# Patient Record
Sex: FEMALE | Race: WHITE
[De-identification: names, ages, dates, MRNs, and addresses within clinical notes are randomized per-mention and may not be internally consistent; named-entity substitution may affect disease eponyms.]

---

## 2017-01-09 ENCOUNTER — PRIOR ORIGINAL RECORDS (OUTPATIENT)
Dept: OTHER | Age: 82
End: 2017-01-09

## 2017-01-10 LAB
BUN: 24 MG/DL
CALCIUM: 9.6 MG/DL
CHLORIDE: 103 MEQ/L
CREATININE, SERUM: 1 MG/DL
GLUCOSE: 148 MG/DL
POTASSIUM, SERUM: 4.5 MEQ/L
SODIUM: 146 MEQ/L

## 2017-01-20 ENCOUNTER — PRIOR ORIGINAL RECORDS (OUTPATIENT)
Dept: OTHER | Age: 82
End: 2017-01-20

## 2017-02-06 ENCOUNTER — PRIOR ORIGINAL RECORDS (OUTPATIENT)
Dept: OTHER | Age: 82
End: 2017-02-06

## 2017-02-07 ENCOUNTER — PRIOR ORIGINAL RECORDS (OUTPATIENT)
Dept: OTHER | Age: 82
End: 2017-02-07

## 2017-02-07 LAB
BUN: 26 MG/DL
CALCIUM: 9.2 MG/DL
CHLORIDE: 103 MEQ/L
CREATININE, SERUM: 1.2 MG/DL
GLUCOSE: 137 MG/DL
INR: 2.7
POTASSIUM, SERUM: 4.3 MEQ/L
SODIUM: 146 MEQ/L

## 2017-02-08 ENCOUNTER — PRIOR ORIGINAL RECORDS (OUTPATIENT)
Dept: OTHER | Age: 82
End: 2017-02-08

## 2017-02-24 ENCOUNTER — PRIOR ORIGINAL RECORDS (OUTPATIENT)
Dept: OTHER | Age: 82
End: 2017-02-24

## 2017-02-27 ENCOUNTER — PRIOR ORIGINAL RECORDS (OUTPATIENT)
Dept: OTHER | Age: 82
End: 2017-02-27

## 2017-02-28 ENCOUNTER — PRIOR ORIGINAL RECORDS (OUTPATIENT)
Dept: OTHER | Age: 82
End: 2017-02-28

## 2017-02-28 ENCOUNTER — HOSPITAL ENCOUNTER (OUTPATIENT)
Dept: LAB | Facility: HOSPITAL | Age: 82
Discharge: HOME | End: 2017-02-28
Attending: INTERNAL MEDICINE
Payer: MEDICARE

## 2017-02-28 LAB
ALBUMIN SERPL-MCNC: 3.9 G/DL (ref 3.5–4.8)
ALP LIVER SERPL-CCNC: 432 U/L (ref 55–142)
ALT SERPL-CCNC: 32 U/L (ref 14–54)
AST SERPL-CCNC: 26 U/L (ref 15–41)
BASOPHILS # BLD AUTO: 0.06 X10(3) UL (ref 0–0.1)
BASOPHILS NFR BLD AUTO: 0.7 %
BETA NATRIURETIC PEPTIDE: 404 PG/ML (ref 2–99)
BILIRUB SERPL-MCNC: 1.8 MG/DL (ref 0.1–2)
BUN BLD-MCNC: 17 MG/DL (ref 8–20)
CALCIUM BLD-MCNC: 9.7 MG/DL (ref 8.3–10.3)
CHLORIDE: 107 MMOL/L (ref 101–111)
CO2: 30 MMOL/L (ref 22–32)
CREAT BLD-MCNC: 1.19 MG/DL (ref 0.55–1.02)
EOSINOPHIL # BLD AUTO: 0.09 X10(3) UL (ref 0–0.3)
EOSINOPHIL NFR BLD AUTO: 1 %
ERYTHROCYTE [DISTWIDTH] IN BLOOD BY AUTOMATED COUNT: 14.6 % (ref 11.5–16)
GLUCOSE BLD-MCNC: 125 MG/DL (ref 70–99)
HCT VFR BLD AUTO: 39.3 % (ref 34–50)
HGB BLD-MCNC: 12.5 G/DL (ref 12–16)
IMMATURE GRANULOCYTE COUNT: 0.03 X10(3) UL (ref 0–1)
IMMATURE GRANULOCYTE RATIO %: 0.3 %
LYMPHOCYTES # BLD AUTO: 1 X10(3) UL (ref 0.9–4)
LYMPHOCYTES NFR BLD AUTO: 10.9 %
M PROTEIN MFR SERPL ELPH: 7.5 G/DL (ref 6.1–8.3)
MCH RBC QN AUTO: 28.3 PG (ref 27–33.2)
MCHC RBC AUTO-ENTMCNC: 31.8 G/DL (ref 31–37)
MCV RBC AUTO: 89.1 FL (ref 81–100)
MONOCYTES # BLD AUTO: 0.75 X10(3) UL (ref 0.1–0.6)
MONOCYTES NFR BLD AUTO: 8.2 %
NEUTROPHIL ABS PRELIM: 7.25 X10 (3) UL (ref 1.3–6.7)
NEUTROPHILS # BLD AUTO: 7.25 X10(3) UL (ref 1.3–6.7)
NEUTROPHILS NFR BLD AUTO: 78.9 %
PLATELET # BLD AUTO: 202 10(3)UL (ref 150–450)
POTASSIUM SERPL-SCNC: 3.7 MMOL/L (ref 3.6–5.1)
RBC # BLD AUTO: 4.41 X10(6)UL (ref 3.8–5.1)
RED CELL DISTRIBUTION WIDTH-SD: 47.6 FL (ref 35.1–46.3)
SODIUM SERPL-SCNC: 144 MMOL/L (ref 136–144)
WBC # BLD AUTO: 9.2 X10(3) UL (ref 4–13)

## 2017-02-28 PROCEDURE — 36415 COLL VENOUS BLD VENIPUNCTURE: CPT | Performed by: INTERNAL MEDICINE

## 2017-02-28 PROCEDURE — 85025 COMPLETE CBC W/AUTO DIFF WBC: CPT | Performed by: INTERNAL MEDICINE

## 2017-02-28 PROCEDURE — 83880 ASSAY OF NATRIURETIC PEPTIDE: CPT | Performed by: INTERNAL MEDICINE

## 2017-02-28 PROCEDURE — 80053 COMPREHEN METABOLIC PANEL: CPT | Performed by: INTERNAL MEDICINE

## 2017-03-07 ENCOUNTER — PRIOR ORIGINAL RECORDS (OUTPATIENT)
Dept: OTHER | Age: 82
End: 2017-03-07

## 2017-03-14 ENCOUNTER — PRIOR ORIGINAL RECORDS (OUTPATIENT)
Dept: OTHER | Age: 82
End: 2017-03-14

## 2017-03-16 LAB
ALBUMIN: 3.9 G/DL
ALKALINE PHOSPHATATE(ALK PHOS): 432 IU/L
BILIRUBIN TOTAL: 1.8 MG/DL
BNP: 404 PMOL/L
BUN: 17 MG/DL
CALCIUM: 9.7 MG/DL
CHLORIDE: 107 MEQ/L
CREATININE, SERUM: 1.19 MG/DL
GLUCOSE: 125 MG/DL
HEMATOCRIT: 39.3 %
HEMOGLOBIN: 12.5 G/DL
PLATELETS: 202 K/UL
POTASSIUM, SERUM: 3.7 MEQ/L
PROTEIN, TOTAL: 7.5 G/DL
RED BLOOD COUNT: 4.41 X 10-6/U
SGOT (AST): 26 IU/L
SGPT (ALT): 32 IU/L
SODIUM: 144 MEQ/L
WHITE BLOOD COUNT: 9.2 X 10-3/U

## 2017-04-07 ENCOUNTER — MYAURORA ACCOUNT LINK (OUTPATIENT)
Dept: OTHER | Age: 82
End: 2017-04-07

## 2017-04-07 ENCOUNTER — PRIOR ORIGINAL RECORDS (OUTPATIENT)
Dept: OTHER | Age: 82
End: 2017-04-07

## 2017-04-17 ENCOUNTER — HOSPITAL ENCOUNTER (OUTPATIENT)
Dept: CV DIAGNOSTICS | Facility: HOSPITAL | Age: 82
Discharge: HOME | End: 2017-04-17
Attending: INTERNAL MEDICINE
Payer: MEDICARE

## 2017-04-17 DIAGNOSIS — I25.10 CAD (CORONARY ARTERY DISEASE): ICD-10-CM

## 2017-04-17 DIAGNOSIS — I20.8 ANGINA AT REST (HCC): ICD-10-CM

## 2017-04-17 PROCEDURE — 78452 HT MUSCLE IMAGE SPECT MULT: CPT | Performed by: INTERNAL MEDICINE

## 2017-04-17 PROCEDURE — 93018 CV STRESS TEST I&R ONLY: CPT | Performed by: INTERNAL MEDICINE

## 2017-04-17 PROCEDURE — 78452 HT MUSCLE IMAGE SPECT MULT: CPT

## 2017-04-17 PROCEDURE — 93017 CV STRESS TEST TRACING ONLY: CPT

## 2017-04-21 ENCOUNTER — PRIOR ORIGINAL RECORDS (OUTPATIENT)
Dept: OTHER | Age: 82
End: 2017-04-21

## 2017-05-11 ENCOUNTER — RT VISIT (OUTPATIENT)
Dept: RESPIRATORY THERAPY | Facility: HOSPITAL | Age: 82
End: 2017-05-11
Attending: INTERNAL MEDICINE
Payer: MEDICARE

## 2017-05-11 DIAGNOSIS — R06.00 DYSPNEA: ICD-10-CM

## 2017-05-11 DIAGNOSIS — I25.10 CORONARY ARTERY DISEASE: ICD-10-CM

## 2017-05-11 DIAGNOSIS — I34.9 NONRHEUMATIC MITRAL VALVE DISORDER: ICD-10-CM

## 2017-05-11 PROCEDURE — 94726 PLETHYSMOGRAPHY LUNG VOLUMES: CPT

## 2017-05-11 PROCEDURE — 94729 DIFFUSING CAPACITY: CPT

## 2017-05-11 PROCEDURE — 94010 BREATHING CAPACITY TEST: CPT

## 2017-05-12 NOTE — PROCEDURES
Spirometry and flow volume loop appear normal with no evidence of airway obstruction   Normal TLC, no evidence of restriction  The diffusing capacity is moderately decreased,  but corrects into the normal range when alveolar lung volumes are taken into acc

## 2017-05-22 ENCOUNTER — PRIOR ORIGINAL RECORDS (OUTPATIENT)
Dept: OTHER | Age: 82
End: 2017-05-22

## 2017-08-02 ENCOUNTER — MYAURORA ACCOUNT LINK (OUTPATIENT)
Dept: OTHER | Age: 82
End: 2017-08-02

## 2017-08-10 ENCOUNTER — PRIOR ORIGINAL RECORDS (OUTPATIENT)
Dept: OTHER | Age: 82
End: 2017-08-10

## 2017-08-14 ENCOUNTER — PRIOR ORIGINAL RECORDS (OUTPATIENT)
Dept: OTHER | Age: 82
End: 2017-08-14

## 2017-08-14 LAB
ALBUMIN: 4 G/DL
ALKALINE PHOSPHATATE(ALK PHOS): 362 IU/L
BILIRUBIN TOTAL: 1.6 MG/DL
BUN: 26 MG/DL
CALCIUM: 9.4 MG/DL
CHLORIDE: 105 MEQ/L
CREATININE, SERUM: 1.2 MG/DL
GLUCOSE: 144 MG/DL
POTASSIUM, SERUM: 5.1 MEQ/L
PROTEIN, TOTAL: 6.5 G/DL
SGOT (AST): 14 IU/L
SGPT (ALT): 10 IU/L
SODIUM: 145 MEQ/L

## 2017-08-17 ENCOUNTER — PRIOR ORIGINAL RECORDS (OUTPATIENT)
Dept: OTHER | Age: 82
End: 2017-08-17

## 2017-08-28 ENCOUNTER — PRIOR ORIGINAL RECORDS (OUTPATIENT)
Dept: OTHER | Age: 82
End: 2017-08-28

## 2017-11-01 ENCOUNTER — PRIOR ORIGINAL RECORDS (OUTPATIENT)
Dept: OTHER | Age: 82
End: 2017-11-01

## 2017-11-01 ENCOUNTER — MYAURORA ACCOUNT LINK (OUTPATIENT)
Dept: OTHER | Age: 82
End: 2017-11-01

## 2017-11-03 ENCOUNTER — PRIOR ORIGINAL RECORDS (OUTPATIENT)
Dept: OTHER | Age: 82
End: 2017-11-03

## 2017-11-29 ENCOUNTER — PRIOR ORIGINAL RECORDS (OUTPATIENT)
Dept: OTHER | Age: 82
End: 2017-11-29

## 2017-12-06 ENCOUNTER — HOSPITAL ENCOUNTER (OUTPATIENT)
Dept: CV DIAGNOSTICS | Facility: HOSPITAL | Age: 82
Discharge: HOME | End: 2017-12-06
Attending: INTERNAL MEDICINE

## 2017-12-06 ENCOUNTER — MYAURORA ACCOUNT LINK (OUTPATIENT)
Dept: OTHER | Age: 82
End: 2017-12-06

## 2017-12-06 DIAGNOSIS — I25.10 CORONARY ARTERIOSCLEROSIS: ICD-10-CM

## 2017-12-06 DIAGNOSIS — I34.8 OTHER NONRHEUMATIC MITRAL VALVE DISORDERS (CODE): ICD-10-CM

## 2017-12-12 ENCOUNTER — PRIOR ORIGINAL RECORDS (OUTPATIENT)
Dept: OTHER | Age: 82
End: 2017-12-12

## 2017-12-13 ENCOUNTER — PRIOR ORIGINAL RECORDS (OUTPATIENT)
Dept: OTHER | Age: 82
End: 2017-12-13

## 2018-01-08 LAB
ALBUMIN: 4 G/DL
ALKALINE PHOSPHATATE(ALK PHOS): 504 IU/L
BILIRUBIN TOTAL: 1.7 MG/DL
BUN: 17 MG/DL
CALCIUM: 9.6 MG/DL
CHLORIDE: 105 MEQ/L
CHOLESTEROL, TOTAL: 159 MG/DL
CREATININE, SERUM: 1.1 MG/DL
GLUCOSE: 137 MG/DL
HDL CHOLESTEROL: 57 MG/DL
HEMATOCRIT: 40.2 %
HEMOGLOBIN A1C: 7.1 %
HEMOGLOBIN: 12.9 G/DL
LDL CHOLESTEROL: 82 MG/DL
PLATELETS: 163 K/UL
POTASSIUM, SERUM: 4.4 MEQ/L
PROBNP: 2278 PG/ML
PROTEIN, TOTAL: 6.6 G/DL
RED BLOOD COUNT: 4.3 X 10-6/U
SGOT (AST): 18 IU/L
SGPT (ALT): 15 IU/L
SODIUM: 146 MEQ/L
THYROID STIMULATING HORMONE: 3.84 MLU/L
TRIGLYCERIDES: 101 MG/DL
WHITE BLOOD COUNT: 6.2 X 10-3/U

## 2018-06-20 ENCOUNTER — PRIOR ORIGINAL RECORDS (OUTPATIENT)
Dept: OTHER | Age: 83
End: 2018-06-20

## 2018-06-27 ENCOUNTER — HOSPITAL ENCOUNTER (OUTPATIENT)
Dept: CARDIOLOGY CLINIC | Facility: HOSPITAL | Age: 83
Discharge: HOME | End: 2018-06-27
Attending: NURSE PRACTITIONER
Payer: MEDICARE

## 2018-06-27 ENCOUNTER — MYAURORA ACCOUNT LINK (OUTPATIENT)
Dept: OTHER | Age: 83
End: 2018-06-27

## 2018-06-27 ENCOUNTER — PRIOR ORIGINAL RECORDS (OUTPATIENT)
Dept: OTHER | Age: 83
End: 2018-06-27

## 2018-06-27 VITALS
OXYGEN SATURATION: 95 % | DIASTOLIC BLOOD PRESSURE: 55 MMHG | SYSTOLIC BLOOD PRESSURE: 140 MMHG | HEART RATE: 78 BPM | RESPIRATION RATE: 17 BRPM

## 2018-06-27 DIAGNOSIS — I50.31 ACUTE DIASTOLIC CONGESTIVE HEART FAILURE (HCC): Primary | ICD-10-CM

## 2018-06-27 PROCEDURE — 80048 BASIC METABOLIC PNL TOTAL CA: CPT | Performed by: NURSE PRACTITIONER

## 2018-06-27 PROCEDURE — 36415 COLL VENOUS BLD VENIPUNCTURE: CPT

## 2018-06-27 PROCEDURE — 85027 COMPLETE CBC AUTOMATED: CPT | Performed by: NURSE PRACTITIONER

## 2018-06-27 PROCEDURE — A4216 STERILE WATER/SALINE, 10 ML: HCPCS | Performed by: NURSE PRACTITIONER

## 2018-06-27 PROCEDURE — 83880 ASSAY OF NATRIURETIC PEPTIDE: CPT | Performed by: NURSE PRACTITIONER

## 2018-06-27 PROCEDURE — 99211 OFF/OP EST MAY X REQ PHY/QHP: CPT

## 2018-06-27 PROCEDURE — 96375 TX/PRO/DX INJ NEW DRUG ADDON: CPT

## 2018-06-27 PROCEDURE — 96374 THER/PROPH/DIAG INJ IV PUSH: CPT

## 2018-06-27 RX ORDER — BUMETANIDE 0.25 MG/ML
3 INJECTION, SOLUTION INTRAMUSCULAR; INTRAVENOUS ONCE
Status: COMPLETED | OUTPATIENT
Start: 2018-06-27 | End: 2018-06-27

## 2018-06-27 RX ADMIN — BUMETANIDE 3 MG: 0.25 INJECTION, SOLUTION INTRAMUSCULAR; INTRAVENOUS at 15:03:00

## 2018-06-27 NOTE — PROGRESS NOTES
Pt here for RN visit for IV diuretics. 22G IV inserted into left forearm and labs drawn from site. IV Diuril 250mg and IV Bumex 3mg given. Pt tolerated well. Pt to follow in [facility], appointment made. IV removed, catheter tip intact and dressing applied.

## 2018-06-27 NOTE — PROGRESS NOTES
RN visit only for IV diuretics, Diuril 250mg IVP x 1 and Bumex 3mg IVP x 1 sent from Dr. Andreina Burton office by Florinda SPEARS. She would like us to see her regularly in the [facility] [address] with a new patient appt in 2 weeks.  Reyes Prince was unable

## 2018-07-03 ENCOUNTER — TELEPHONE (OUTPATIENT)
Dept: CARDIOLOGY CLINIC | Facility: HOSPITAL | Age: 83
End: 2018-07-03

## 2018-07-03 NOTE — PROGRESS NOTES
Attempted to reach pt, no answer, left voice message for her to call back. Purpose of phone call was to check weights. Pt was [facility] to [facility] by REGAN Wynn for a nurse visit/IV diuretics.

## 2018-07-03 NOTE — PROGRESS NOTES
Received return phone call from pt - states she is feeling better, breathing is much better than when she came in to see Caty Angela in the office. When she came in for her office visit, she was up by 2 pounds.  Today she states she is back to her normal weight

## 2018-07-05 ENCOUNTER — TELEPHONE (OUTPATIENT)
Dept: CARDIOLOGY CLINIC | Facility: HOSPITAL | Age: 83
End: 2018-07-05

## 2018-07-05 NOTE — PROGRESS NOTES
Labs reviewed. No changes made.     Na 145  K 5.0  Chl 103  CO2 26  Anion gap 16  Calcium 9.3  BUN 32  Cr 1.2    REGAN Luevano  7/5/2018  7:00 PM  [address]

## 2018-07-11 ENCOUNTER — HOSPITAL ENCOUNTER (OUTPATIENT)
Dept: CARDIOLOGY CLINIC | Facility: HOSPITAL | Age: 83
Discharge: HOME | End: 2018-07-11
Attending: NURSE PRACTITIONER
Payer: MEDICARE

## 2018-07-11 VITALS
DIASTOLIC BLOOD PRESSURE: 53 MMHG | OXYGEN SATURATION: 99 % | SYSTOLIC BLOOD PRESSURE: 139 MMHG | WEIGHT: 140.81 LBS | HEART RATE: 83 BPM | RESPIRATION RATE: 20 BRPM

## 2018-07-11 DIAGNOSIS — I10 ESSENTIAL HYPERTENSION: ICD-10-CM

## 2018-07-11 DIAGNOSIS — I50.33 ACUTE ON CHRONIC HEART FAILURE WITH NORMAL EJECTION FRACTION (HCC): Primary | ICD-10-CM

## 2018-07-11 DIAGNOSIS — Z95.1 HX OF CABG: ICD-10-CM

## 2018-07-11 DIAGNOSIS — I48.20 CHRONIC ATRIAL FIBRILLATION (HCC): ICD-10-CM

## 2018-07-11 DIAGNOSIS — N18.30 CKD (CHRONIC KIDNEY DISEASE) STAGE 3, GFR 30-59 ML/MIN (HCC): ICD-10-CM

## 2018-07-11 LAB
BUN BLD-MCNC: 24 MG/DL (ref 8–20)
CALCIUM BLD-MCNC: 9.3 MG/DL (ref 8.3–10.3)
CHLORIDE: 107 MMOL/L (ref 101–111)
CO2: 26 MMOL/L (ref 22–32)
CREAT BLD-MCNC: 1.26 MG/DL (ref 0.55–1.02)
GLUCOSE BLD-MCNC: 114 MG/DL (ref 70–99)
POTASSIUM SERPL-SCNC: 4.2 MMOL/L (ref 3.6–5.1)
SODIUM SERPL-SCNC: 141 MMOL/L (ref 136–144)

## 2018-07-11 PROCEDURE — 99204 OFFICE O/P NEW MOD 45 MIN: CPT | Performed by: NURSE PRACTITIONER

## 2018-07-11 RX ORDER — CHOLECALCIFEROL (VITAMIN D3) 1250 MCG
CAPSULE ORAL WEEKLY
COMMUNITY
End: 2020-03-09

## 2018-07-11 RX ORDER — TORSEMIDE 20 MG/1
20 TABLET ORAL 2 TIMES DAILY
COMMUNITY
End: 2019-03-04

## 2018-07-11 RX ORDER — AMLODIPINE BESYLATE 5 MG/1
5 TABLET ORAL 2 TIMES DAILY
COMMUNITY
End: 2019-04-15

## 2018-07-11 RX ORDER — BUMETANIDE 0.25 MG/ML
3 INJECTION, SOLUTION INTRAMUSCULAR; INTRAVENOUS ONCE
Status: COMPLETED | OUTPATIENT
Start: 2018-07-11 | End: 2018-07-11

## 2018-07-11 RX ORDER — METOPROLOL SUCCINATE 200 MG/1
200 TABLET, EXTENDED RELEASE ORAL NIGHTLY
COMMUNITY

## 2018-07-11 RX ORDER — WARFARIN SODIUM 2.5 MG/1
2.5 TABLET ORAL
Status: ON HOLD | COMMUNITY
End: 2020-05-18

## 2018-07-11 RX ORDER — WARFARIN SODIUM 5 MG/1
5 TABLET ORAL
COMMUNITY
End: 2020-03-13

## 2018-07-11 RX ORDER — LOSARTAN POTASSIUM 100 MG/1
100 TABLET ORAL NIGHTLY
COMMUNITY

## 2018-07-11 RX ADMIN — BUMETANIDE 3 MG: 0.25 INJECTION, SOLUTION INTRAMUSCULAR; INTRAVENOUS at 13:11:00

## 2018-07-11 NOTE — PROGRESS NOTES
Pt. Assessed. Pt. c/o shortness of breath that gets better as the day progresses. Weight 140 lbs. Pt states her weight is stable and at baseline, she has some LE edema. APN notified of pt's complaints. Labs ordered.  Reviewed allergies and list of current

## 2018-07-11 NOTE — PROGRESS NOTES
[facility] Cardiac Health Progress Note    Nito Cronin is a 80year old female who presents to clinic at the request of Dr. Juana Dang for APN assessment and management of chronic HFpEF and is functional class 3.   Patient has a history bases  CV:                 Irregularly irregular  Abdomen:      Non-distended, soft, non-tender, BS+  Extremities:    Trace-+1 LE edema; 2+ pedal pulses  Skin:               Pink, warm, dry   Neurological:  A&O x 3; GARCIA      Current Outpatient Prescription (S): 18mm Hg. Valve area (VTI): 1.33cm^2. Indexed valve     area (VTI): 0.81cm^2/m^2.  4. Mitral valve: A prosthesis was present and functioning normally. The     prosthesis had a normal range of motion.  The sewing ring appeared normal,     had no rocking this patient providing counseling, coordination of care and education related specifically to heart failure.     REGAN Peralta  7/11/2018

## 2018-07-13 ENCOUNTER — MYAURORA ACCOUNT LINK (OUTPATIENT)
Dept: OTHER | Age: 83
End: 2018-07-13

## 2018-07-13 ENCOUNTER — HOSPITAL ENCOUNTER (OUTPATIENT)
Dept: CV DIAGNOSTICS | Facility: HOSPITAL | Age: 83
Discharge: HOME | End: 2018-07-13
Attending: INTERNAL MEDICINE

## 2018-07-13 DIAGNOSIS — R06.09 OTHER FORMS OF DYSPNEA: ICD-10-CM

## 2018-07-13 DIAGNOSIS — I48.91 ATRIAL FIBRILLATION, UNSPECIFIED TYPE (HCC): ICD-10-CM

## 2018-07-16 ENCOUNTER — PRIOR ORIGINAL RECORDS (OUTPATIENT)
Dept: OTHER | Age: 83
End: 2018-07-16

## 2018-07-30 ENCOUNTER — HOSPITAL ENCOUNTER (OUTPATIENT)
Dept: CARDIOLOGY CLINIC | Facility: HOSPITAL | Age: 83
Discharge: HOME | End: 2018-07-30
Attending: NURSE PRACTITIONER
Payer: MEDICARE

## 2018-07-30 VITALS
HEART RATE: 91 BPM | RESPIRATION RATE: 22 BRPM | OXYGEN SATURATION: 93 % | WEIGHT: 140.38 LBS | SYSTOLIC BLOOD PRESSURE: 132 MMHG | DIASTOLIC BLOOD PRESSURE: 56 MMHG

## 2018-07-30 DIAGNOSIS — I10 ESSENTIAL HYPERTENSION: ICD-10-CM

## 2018-07-30 DIAGNOSIS — I50.32 CHRONIC HEART FAILURE WITH NORMAL EJECTION FRACTION (HCC): Primary | ICD-10-CM

## 2018-07-30 DIAGNOSIS — I48.20 CHRONIC ATRIAL FIBRILLATION (HCC): ICD-10-CM

## 2018-07-30 DIAGNOSIS — N18.30 CKD (CHRONIC KIDNEY DISEASE) STAGE 3, GFR 30-59 ML/MIN (HCC): ICD-10-CM

## 2018-07-30 LAB
ANION GAP SERPL CALC-SCNC: 9 MMOL/L (ref 0–18)
BUN BLD-MCNC: 25 MG/DL (ref 8–20)
BUN/CREAT SERPL: 21.2 (ref 10–20)
CALCIUM BLD-MCNC: 9.4 MG/DL (ref 8.3–10.3)
CHLORIDE SERPL-SCNC: 107 MMOL/L (ref 101–111)
CO2 SERPL-SCNC: 26 MMOL/L (ref 22–32)
CREAT BLD-MCNC: 1.18 MG/DL (ref 0.55–1.02)
GLUCOSE BLD-MCNC: 121 MG/DL (ref 70–99)
OSMOLALITY SERPL CALC.SUM OF ELEC: 300 MOSM/KG (ref 275–295)
POTASSIUM SERPL-SCNC: 4 MMOL/L (ref 3.6–5.1)
SODIUM SERPL-SCNC: 142 MMOL/L (ref 136–144)

## 2018-07-30 PROCEDURE — 99214 OFFICE O/P EST MOD 30 MIN: CPT | Performed by: NURSE PRACTITIONER

## 2018-07-30 NOTE — PROGRESS NOTES
[facility] Cardiac [facility] Progress Note    Zhanna Grigsby is a 80year old female who presents to clinic for APN assessment and management of chronic preserved EF/RV heart failure and is functional class 3.      Subjective:  She returns fo  07/30/2018   CO2 26.0 07/30/2018    07/30/2018   CA 9.4 07/30/2018         Current Outpatient Prescriptions:   •  torsemide 20 MG Oral Tab, Take 20 mg by mouth 2 (two) times daily.   , Disp: , Rfl:   •  losartan 100 MG Oral Tab, Take by mout · Prescribed home PT for unsteady gait.   · Return to clinic in 3-4 weeks  · CHF discharge instructions given    I spent greater than >30 minutes with this patient providing counseling, coordination of care and education related specifically to heart fail

## 2018-07-30 NOTE — PROGRESS NOTES
Pt. Assessed. No s/s of shortness of breath, fatigue, chest pain or edema noted. Weight stable at 140  lbs. Reviewed current list of patient's allergies and medication; updated EMR. Labs drawn to assess kidney function.  Reviewed follow-up appointment and H

## 2018-08-01 ENCOUNTER — PRIOR ORIGINAL RECORDS (OUTPATIENT)
Dept: OTHER | Age: 83
End: 2018-08-01

## 2018-08-21 ENCOUNTER — HOSPITAL ENCOUNTER (OUTPATIENT)
Dept: CARDIOLOGY CLINIC | Facility: HOSPITAL | Age: 83
Discharge: HOME | End: 2018-08-21
Attending: NURSE PRACTITIONER
Payer: MEDICARE

## 2018-08-21 VITALS
HEART RATE: 68 BPM | OXYGEN SATURATION: 96 % | WEIGHT: 141 LBS | SYSTOLIC BLOOD PRESSURE: 138 MMHG | DIASTOLIC BLOOD PRESSURE: 119 MMHG | RESPIRATION RATE: 17 BRPM

## 2018-08-21 DIAGNOSIS — I48.20 CHRONIC ATRIAL FIBRILLATION (HCC): ICD-10-CM

## 2018-08-21 DIAGNOSIS — N18.30 CKD (CHRONIC KIDNEY DISEASE) STAGE 3, GFR 30-59 ML/MIN (HCC): ICD-10-CM

## 2018-08-21 DIAGNOSIS — I50.32 CHRONIC HEART FAILURE WITH NORMAL EJECTION FRACTION (HCC): Primary | ICD-10-CM

## 2018-08-21 DIAGNOSIS — I10 ESSENTIAL HYPERTENSION: ICD-10-CM

## 2018-08-21 LAB
ANION GAP SERPL CALC-SCNC: 8 MMOL/L (ref 0–18)
BUN BLD-MCNC: 32 MG/DL (ref 8–20)
BUN/CREAT SERPL: 26 (ref 10–20)
CALCIUM BLD-MCNC: 9.6 MG/DL (ref 8.3–10.3)
CHLORIDE SERPL-SCNC: 107 MMOL/L (ref 101–111)
CO2 SERPL-SCNC: 29 MMOL/L (ref 22–32)
CREAT BLD-MCNC: 1.23 MG/DL (ref 0.55–1.02)
GLUCOSE BLD-MCNC: 128 MG/DL (ref 70–99)
OSMOLALITY SERPL CALC.SUM OF ELEC: 307 MOSM/KG (ref 275–295)
POTASSIUM SERPL-SCNC: 4.2 MMOL/L (ref 3.6–5.1)
SODIUM SERPL-SCNC: 144 MMOL/L (ref 136–144)

## 2018-08-21 PROCEDURE — 99214 OFFICE O/P EST MOD 30 MIN: CPT | Performed by: NURSE PRACTITIONER

## 2018-08-21 NOTE — PROGRESS NOTES
[facility] Cardiac Health Progress Note    Mortimer Latch is a 80year old female who presents to clinic for APN assessment and management of chronic HFpEF/RV heart failure and is functional class 3.      Subjective:  She returns for routi Oral Tablet 24 Hr, Take 200 mg by mouth nightly., Disp: , Rfl:   •  Warfarin Sodium 5 MG Oral Tab, Take 5 mg by mouth 4 (four) times a week. Tue, Thu, Sat, Sun, Disp: , Rfl:   •  Warfarin Sodium 2.5 MG Oral Tab, Take 2.5 mg by mouth 3 (three) times a week.

## 2018-08-21 NOTE — PROGRESS NOTES
Pt. assessed. No s/s of shortness of breath, fatigue, chest pain or edema noted. Weight stable at 141.0 lbs. Reviewed current list of patient's allergies and medication; updated EMR. Labs ordered and drawn in the [facility] to assess kidney function.  Reviewed fol

## 2018-08-22 ENCOUNTER — MYAURORA ACCOUNT LINK (OUTPATIENT)
Dept: OTHER | Age: 83
End: 2018-08-22

## 2018-09-07 ENCOUNTER — PRIOR ORIGINAL RECORDS (OUTPATIENT)
Dept: OTHER | Age: 83
End: 2018-09-07

## 2018-09-13 ENCOUNTER — PRIOR ORIGINAL RECORDS (OUTPATIENT)
Dept: OTHER | Age: 83
End: 2018-09-13

## 2018-09-13 LAB — INR: 0

## 2018-09-21 ENCOUNTER — PRIOR ORIGINAL RECORDS (OUTPATIENT)
Dept: OTHER | Age: 83
End: 2018-09-21

## 2018-09-21 LAB — INR: 1.9

## 2018-10-04 ENCOUNTER — HOSPITAL ENCOUNTER (OUTPATIENT)
Dept: LAB | Facility: HOSPITAL | Age: 83
Discharge: HOME | End: 2018-10-04
Attending: NURSE PRACTITIONER
Payer: MEDICARE

## 2018-10-04 ENCOUNTER — HOSPITAL ENCOUNTER (OUTPATIENT)
Dept: CARDIOLOGY CLINIC | Facility: HOSPITAL | Age: 83
Discharge: HOME | End: 2018-10-04
Attending: NURSE PRACTITIONER
Payer: MEDICARE

## 2018-10-04 VITALS
DIASTOLIC BLOOD PRESSURE: 46 MMHG | OXYGEN SATURATION: 92 % | HEART RATE: 67 BPM | SYSTOLIC BLOOD PRESSURE: 119 MMHG | RESPIRATION RATE: 20 BRPM | WEIGHT: 141.19 LBS

## 2018-10-04 DIAGNOSIS — I10 ESSENTIAL HYPERTENSION: ICD-10-CM

## 2018-10-04 DIAGNOSIS — I50.33 ACUTE ON CHRONIC HEART FAILURE WITH NORMAL EJECTION FRACTION (HCC): ICD-10-CM

## 2018-10-04 DIAGNOSIS — I50.9 CHF (CONGESTIVE HEART FAILURE) (HCC): ICD-10-CM

## 2018-10-04 DIAGNOSIS — I50.813 ACUTE ON CHRONIC RIGHT-SIDED HEART FAILURE (HCC): Primary | ICD-10-CM

## 2018-10-04 DIAGNOSIS — I48.20 CHRONIC ATRIAL FIBRILLATION (HCC): ICD-10-CM

## 2018-10-04 DIAGNOSIS — N18.30 CKD (CHRONIC KIDNEY DISEASE) STAGE 3, GFR 30-59 ML/MIN (HCC): ICD-10-CM

## 2018-10-04 PROCEDURE — 80048 BASIC METABOLIC PNL TOTAL CA: CPT | Performed by: NURSE PRACTITIONER

## 2018-10-04 PROCEDURE — 36415 COLL VENOUS BLD VENIPUNCTURE: CPT | Performed by: NURSE PRACTITIONER

## 2018-10-04 PROCEDURE — 85027 COMPLETE CBC AUTOMATED: CPT | Performed by: NURSE PRACTITIONER

## 2018-10-04 PROCEDURE — 99215 OFFICE O/P EST HI 40 MIN: CPT | Performed by: NURSE PRACTITIONER

## 2018-10-04 RX ORDER — SPIRONOLACTONE 25 MG/1
12.5 TABLET ORAL DAILY
Qty: 30 TABLET | Refills: 6 | Status: SHIPPED | OUTPATIENT
Start: 2018-10-04 | End: 2019-10-18

## 2018-10-04 RX ORDER — BUMETANIDE 0.25 MG/ML
3 INJECTION, SOLUTION INTRAMUSCULAR; INTRAVENOUS ONCE
Status: COMPLETED | OUTPATIENT
Start: 2018-10-04 | End: 2018-10-04

## 2018-10-04 RX ADMIN — BUMETANIDE 3 MG: 0.25 INJECTION, SOLUTION INTRAMUSCULAR; INTRAVENOUS at 14:47:00

## 2018-10-04 NOTE — PROGRESS NOTES
[facility] Cardiac Health Progress Note    Irlanda Arredondo is a 80year old female who presents to clinic for APN assessment and management of chronic  HFpEF/RV heart failure and is functional class 3.      Subjective:  She returns for rout 32.2   RDW  14.5   WBC  7.5   PLT  176.0           Current Outpatient Medications:   •  torsemide 20 MG Oral Tab, Take 20 mg by mouth 2 (two) times daily.   , Disp: , Rfl:   •  losartan 100 MG Oral Tab, Take by mouth nightly., Disp: , Rfl:   •  Metoprolol S 1  · Return to clinic in 1 week. · F/U with Dr. Jailyn Obrien as planned on 12/20/18.   · CHF discharge instructions given    I spent greater than >40 minutes with this patient providing counseling, coordination of care and education related specifically to hea

## 2018-10-04 NOTE — PROGRESS NOTES
Pt. assessed. Pt. c/o LE edema. Weight stable at 141.2lbs. APN notified of pt's c/o LE edema. Labs ordered and reviewed. Reviewed allergies and list of current medications with pt. and updated it in the EMR. IV established in the [facility] per protocol.  IV bume

## 2018-10-10 ENCOUNTER — HOSPITAL ENCOUNTER (OUTPATIENT)
Dept: CARDIOLOGY CLINIC | Facility: HOSPITAL | Age: 83
Discharge: HOME | End: 2018-10-10
Attending: NURSE PRACTITIONER
Payer: MEDICARE

## 2018-10-10 VITALS
DIASTOLIC BLOOD PRESSURE: 48 MMHG | WEIGHT: 139.13 LBS | RESPIRATION RATE: 24 BRPM | OXYGEN SATURATION: 92 % | SYSTOLIC BLOOD PRESSURE: 129 MMHG | HEART RATE: 68 BPM

## 2018-10-10 DIAGNOSIS — I10 ESSENTIAL HYPERTENSION: ICD-10-CM

## 2018-10-10 DIAGNOSIS — Z98.890 HX OF MITRAL VALVE REPAIR: ICD-10-CM

## 2018-10-10 DIAGNOSIS — I48.20 CHRONIC ATRIAL FIBRILLATION (HCC): ICD-10-CM

## 2018-10-10 DIAGNOSIS — Z87.19 H/O: GI BLEED: ICD-10-CM

## 2018-10-10 DIAGNOSIS — Z95.1 HX OF CABG: ICD-10-CM

## 2018-10-10 DIAGNOSIS — N18.30 CKD (CHRONIC KIDNEY DISEASE) STAGE 3, GFR 30-59 ML/MIN (HCC): ICD-10-CM

## 2018-10-10 DIAGNOSIS — I50.812 CHRONIC RIGHT-SIDED HEART FAILURE (HCC): Primary | ICD-10-CM

## 2018-10-10 PROCEDURE — 99212 OFFICE O/P EST SF 10 MIN: CPT

## 2018-10-10 PROCEDURE — 36415 COLL VENOUS BLD VENIPUNCTURE: CPT

## 2018-10-10 PROCEDURE — 80048 BASIC METABOLIC PNL TOTAL CA: CPT | Performed by: NURSE PRACTITIONER

## 2018-10-10 NOTE — PROGRESS NOTES
Pt assessed. Denies shortness of breath, abdominal bloating, or edema. Weight down 2 lbs at 139.1 lbs. Reports that she is urinating more since the Aldactone was added. Reviewed current list of patient's allergies and medication; updated EMR. Labs ordered.

## 2018-10-10 NOTE — PROGRESS NOTES
[facility]  Heart Failure Clinic Progress Note    Sandra Pretty is a 80year old female who presents to clinic for APN assessment and management of chronic  HFpEF/RV heart failure and is functional class 3.     Subjective:  Oxana Taylor returns for routine file prior to encounter.      Exam:   General:         Alert, in no apparent distress  HEENT:          JVD mildly elevated upright  Lungs:            Clear upper with mild right basilar crackles             CV:                  S1, S2 irreg, irreg, 1/6 AMY

## 2018-10-24 ENCOUNTER — PRIOR ORIGINAL RECORDS (OUTPATIENT)
Dept: OTHER | Age: 83
End: 2018-10-24

## 2018-10-24 LAB — INR: 3.3

## 2018-10-30 ENCOUNTER — PRIOR ORIGINAL RECORDS (OUTPATIENT)
Dept: OTHER | Age: 83
End: 2018-10-30

## 2018-10-30 LAB — INR: 0

## 2018-11-07 ENCOUNTER — MYAURORA ACCOUNT LINK (OUTPATIENT)
Dept: OTHER | Age: 83
End: 2018-11-07

## 2018-11-07 ENCOUNTER — PRIOR ORIGINAL RECORDS (OUTPATIENT)
Dept: OTHER | Age: 83
End: 2018-11-07

## 2018-11-08 ENCOUNTER — HOSPITAL ENCOUNTER (OUTPATIENT)
Dept: LAB | Facility: HOSPITAL | Age: 83
Discharge: HOME | End: 2018-11-08
Attending: NURSE PRACTITIONER
Payer: MEDICARE

## 2018-11-08 ENCOUNTER — HOSPITAL ENCOUNTER (OUTPATIENT)
Dept: CARDIOLOGY CLINIC | Facility: HOSPITAL | Age: 83
Discharge: HOME | End: 2018-11-08
Attending: NURSE PRACTITIONER
Payer: MEDICARE

## 2018-11-08 VITALS
RESPIRATION RATE: 27 BRPM | WEIGHT: 136.38 LBS | DIASTOLIC BLOOD PRESSURE: 53 MMHG | HEART RATE: 66 BPM | SYSTOLIC BLOOD PRESSURE: 128 MMHG | OXYGEN SATURATION: 96 %

## 2018-11-08 DIAGNOSIS — I48.20 CHRONIC ATRIAL FIBRILLATION (HCC): ICD-10-CM

## 2018-11-08 DIAGNOSIS — I27.20 PULMONARY HYPERTENSION (HCC): ICD-10-CM

## 2018-11-08 DIAGNOSIS — E55.9 VITAMIN D DEFICIENCY: ICD-10-CM

## 2018-11-08 DIAGNOSIS — N18.30 CKD (CHRONIC KIDNEY DISEASE) STAGE 3, GFR 30-59 ML/MIN (HCC): ICD-10-CM

## 2018-11-08 DIAGNOSIS — Z95.1 HX OF CABG: ICD-10-CM

## 2018-11-08 DIAGNOSIS — I50.9 CHF (CONGESTIVE HEART FAILURE) (HCC): ICD-10-CM

## 2018-11-08 DIAGNOSIS — I50.812 CHRONIC RIGHT-SIDED HEART FAILURE (HCC): Primary | ICD-10-CM

## 2018-11-08 PROCEDURE — 99214 OFFICE O/P EST MOD 30 MIN: CPT | Performed by: NURSE PRACTITIONER

## 2018-11-08 PROCEDURE — 36415 COLL VENOUS BLD VENIPUNCTURE: CPT | Performed by: NURSE PRACTITIONER

## 2018-11-08 PROCEDURE — 80048 BASIC METABOLIC PNL TOTAL CA: CPT | Performed by: NURSE PRACTITIONER

## 2018-11-08 NOTE — PROGRESS NOTES
Patient assessed. No s/s of shortness of breath, fatigue, chest pain or edema noted. Weight stable at 136 lbs. Reviewed current list of patient's allergies and medication; updated EMR. BMP drawn in lab to assess kidney function.  Reviewed follow-up appointm

## 2018-11-16 ENCOUNTER — PRIOR ORIGINAL RECORDS (OUTPATIENT)
Dept: OTHER | Age: 83
End: 2018-11-16

## 2018-11-16 LAB — INR: 3.9

## 2018-12-13 ENCOUNTER — PRIOR ORIGINAL RECORDS (OUTPATIENT)
Dept: OTHER | Age: 83
End: 2018-12-13

## 2018-12-14 LAB
INR: 2.4
PROTHROMBIN TIME: 28.9 SECONDS

## 2018-12-20 ENCOUNTER — MYAURORA ACCOUNT LINK (OUTPATIENT)
Dept: OTHER | Age: 83
End: 2018-12-20

## 2018-12-20 ENCOUNTER — PRIOR ORIGINAL RECORDS (OUTPATIENT)
Dept: OTHER | Age: 83
End: 2018-12-20

## 2018-12-24 LAB
ALBUMIN: 4.1 G/DL
ALKALINE PHOSPHATATE(ALK PHOS): 507 IU/L
BILIRUBIN TOTAL: 1.4 MG/DL
BUN: 40 MG/DL
CALCIUM: 10 MG/DL
CHLORIDE: 103 MEQ/L
CHOLESTEROL, TOTAL: 140 MG/DL
CREATININE, SERUM: 1.3 MG/DL
GLUCOSE: 106 MG/DL
HDL CHOLESTEROL: 52 MG/DL
HEMATOCRIT: 38.2 %
HEMOGLOBIN: 12.5 G/DL
LDL CHOLESTEROL: 69 MG/DL
PLATELETS: 200 K/UL
POTASSIUM, SERUM: 5.1 MEQ/L
PROBNP: 2149 PG/ML
PROTEIN, TOTAL: 6.6 G/DL
RED BLOOD COUNT: 4.1 X 10-6/U
SGOT (AST): 17 IU/L
SGPT (ALT): 8 IU/L
SODIUM: 139 MEQ/L
THYROID STIMULATING HORMONE: 4.51 MLU/L
TRIGLYCERIDES: 93 MG/DL
VITAMIN D 25-OH: 36 NG/ML
WHITE BLOOD COUNT: 7.8 X 10-3/U

## 2019-01-03 ENCOUNTER — PRIOR ORIGINAL RECORDS (OUTPATIENT)
Dept: OTHER | Age: 84
End: 2019-01-03

## 2019-01-22 LAB — INR: 0

## 2019-01-23 ENCOUNTER — HOSPITAL ENCOUNTER (OUTPATIENT)
Dept: LAB | Facility: HOSPITAL | Age: 84
Discharge: HOME | End: 2019-01-23
Attending: NURSE PRACTITIONER
Payer: MEDICARE

## 2019-01-23 ENCOUNTER — PRIOR ORIGINAL RECORDS (OUTPATIENT)
Dept: OTHER | Age: 84
End: 2019-01-23

## 2019-01-23 ENCOUNTER — HOSPITAL ENCOUNTER (OUTPATIENT)
Dept: CARDIOLOGY CLINIC | Facility: HOSPITAL | Age: 84
Discharge: HOME | End: 2019-01-23
Attending: NURSE PRACTITIONER
Payer: MEDICARE

## 2019-01-23 VITALS
RESPIRATION RATE: 22 BRPM | HEART RATE: 82 BPM | WEIGHT: 138 LBS | SYSTOLIC BLOOD PRESSURE: 145 MMHG | OXYGEN SATURATION: 99 % | DIASTOLIC BLOOD PRESSURE: 51 MMHG

## 2019-01-23 DIAGNOSIS — Z95.1 HX OF CABG: ICD-10-CM

## 2019-01-23 DIAGNOSIS — Z98.890 HX OF MITRAL VALVE REPAIR: ICD-10-CM

## 2019-01-23 DIAGNOSIS — I10 ESSENTIAL HYPERTENSION: ICD-10-CM

## 2019-01-23 DIAGNOSIS — N18.30 CKD (CHRONIC KIDNEY DISEASE) STAGE 3, GFR 30-59 ML/MIN (HCC): ICD-10-CM

## 2019-01-23 DIAGNOSIS — I27.20 PULMONARY HYPERTENSION (HCC): ICD-10-CM

## 2019-01-23 DIAGNOSIS — I50.9 CHF (CONGESTIVE HEART FAILURE) (HCC): ICD-10-CM

## 2019-01-23 DIAGNOSIS — I48.20 CHRONIC ATRIAL FIBRILLATION (HCC): ICD-10-CM

## 2019-01-23 DIAGNOSIS — I50.33 ACUTE ON CHRONIC HEART FAILURE WITH NORMAL EJECTION FRACTION (HCC): Primary | ICD-10-CM

## 2019-01-23 LAB
ANION GAP SERPL CALC-SCNC: 5 MMOL/L (ref 0–18)
BUN BLD-MCNC: 22 MG/DL (ref 8–20)
BUN/CREAT SERPL: 17.2 (ref 10–20)
CALCIUM BLD-MCNC: 9.4 MG/DL (ref 8.3–10.3)
CHLORIDE SERPL-SCNC: 109 MMOL/L (ref 101–111)
CO2 SERPL-SCNC: 28 MMOL/L (ref 22–32)
CREAT BLD-MCNC: 1.28 MG/DL (ref 0.55–1.02)
DEPRECATED HBV CORE AB SER IA-ACNC: 26.3 NG/ML (ref 18–340)
ERYTHROCYTE [DISTWIDTH] IN BLOOD BY AUTOMATED COUNT: 13.9 % (ref 11.5–16)
GLUCOSE BLD-MCNC: 109 MG/DL (ref 70–99)
HCT VFR BLD AUTO: 34.5 % (ref 34–50)
HGB BLD-MCNC: 11.1 G/DL (ref 12–16)
INR BLD: 2.71 (ref 0.9–1.1)
INR: 2.71
IRON SATURATION: 6 % (ref 15–50)
IRON: 27 UG/DL (ref 28–170)
MCH RBC QN AUTO: 30.5 PG (ref 27–33.2)
MCHC RBC AUTO-ENTMCNC: 32.2 G/DL (ref 31–37)
MCV RBC AUTO: 94.8 FL (ref 81–100)
OSMOLALITY SERPL CALC.SUM OF ELEC: 298 MOSM/KG (ref 275–295)
PLATELET # BLD AUTO: 197 10(3)UL (ref 150–450)
POTASSIUM SERPL-SCNC: 4.2 MMOL/L (ref 3.6–5.1)
PRO-BETA NATRIURETIC PEPTIDE: 2931 PG/ML (ref ?–450)
PSA SERPL DL<=0.01 NG/ML-MCNC: 29.6 SECONDS (ref 12.4–14.7)
RBC # BLD AUTO: 3.64 X10(6)UL (ref 3.8–5.1)
RED CELL DISTRIBUTION WIDTH-SD: 48.1 FL (ref 35.1–46.3)
SODIUM SERPL-SCNC: 142 MMOL/L (ref 136–144)
T4 FREE SERPL-MCNC: 1.1 NG/DL (ref 0.9–1.8)
TOTAL IRON BINDING CAPACITY: 444 UG/DL (ref 240–450)
TRANSFERRIN SERPL-MCNC: 298 MG/DL (ref 200–360)
TSI SER-ACNC: 3.21 MIU/ML (ref 0.35–5.5)
WBC # BLD AUTO: 7.8 X10(3) UL (ref 4–13)

## 2019-01-23 PROCEDURE — 85610 PROTHROMBIN TIME: CPT | Performed by: INTERNAL MEDICINE

## 2019-01-23 PROCEDURE — 80048 BASIC METABOLIC PNL TOTAL CA: CPT | Performed by: NURSE PRACTITIONER

## 2019-01-23 PROCEDURE — 85027 COMPLETE CBC AUTOMATED: CPT | Performed by: NURSE PRACTITIONER

## 2019-01-23 PROCEDURE — 83880 ASSAY OF NATRIURETIC PEPTIDE: CPT | Performed by: NURSE PRACTITIONER

## 2019-01-23 PROCEDURE — 36415 COLL VENOUS BLD VENIPUNCTURE: CPT | Performed by: NURSE PRACTITIONER

## 2019-01-23 PROCEDURE — 99215 OFFICE O/P EST HI 40 MIN: CPT | Performed by: NURSE PRACTITIONER

## 2019-01-23 RX ORDER — BUMETANIDE 0.25 MG/ML
2 INJECTION, SOLUTION INTRAMUSCULAR; INTRAVENOUS ONCE
Status: COMPLETED | OUTPATIENT
Start: 2019-01-23 | End: 2019-01-23

## 2019-01-23 RX ADMIN — BUMETANIDE 2 MG: 0.25 INJECTION, SOLUTION INTRAMUSCULAR; INTRAVENOUS at 15:26:00

## 2019-01-23 NOTE — PROGRESS NOTES
Patient assessed. No C/O of Bilateral leg edema. Weight at 138 lbs. C/O  SOB, especially with activity. Reviewed current list of patient's allergies and medication; updated EMR. IV established per protocol. 2 mg of Bumex was given per order.   IV venofer

## 2019-01-23 NOTE — PROGRESS NOTES
[facility] Cardiac Health Progress Note    Sanam Bernal is a 80year old female who presents to clinic for APN assessment and management of chronic RV heart failure with preserved ejection fraction and is functional class 3.      Subject Objective:    Cardiac Rhythm: Atrial fibrillation    /48   Pulse 79   Resp 22   Wt 138 lb (62.6 kg)   SpO2 99%     Lab Results   Component Value Date    T4F 1.1 01/23/2019    TSH 3.210 01/23/2019     Recent Labs   Lab  01/23/19   1433   GLU  109* Take 5 mg by mouth 2 (two) times daily. , Disp: , Rfl:     Exam:   General:         Alert, in no apparent distress  HEENT:          +6-7cm JVD, noted TR  Lungs:            CTAB                     CV:                  Irregularly irregular with 2/6 AMY  Abd

## 2019-01-29 ENCOUNTER — HOSPITAL ENCOUNTER (OUTPATIENT)
Dept: CARDIOLOGY CLINIC | Facility: HOSPITAL | Age: 84
Discharge: HOME | End: 2019-01-29
Attending: NURSE PRACTITIONER
Payer: MEDICARE

## 2019-01-29 VITALS
OXYGEN SATURATION: 97 % | HEART RATE: 72 BPM | WEIGHT: 138.19 LBS | SYSTOLIC BLOOD PRESSURE: 125 MMHG | DIASTOLIC BLOOD PRESSURE: 79 MMHG

## 2019-01-29 DIAGNOSIS — Z95.1 HX OF CABG: ICD-10-CM

## 2019-01-29 DIAGNOSIS — I10 ESSENTIAL HYPERTENSION: ICD-10-CM

## 2019-01-29 DIAGNOSIS — I27.20 PULMONARY HYPERTENSION (HCC): ICD-10-CM

## 2019-01-29 DIAGNOSIS — Z98.890 HX OF MITRAL VALVE REPAIR: ICD-10-CM

## 2019-01-29 DIAGNOSIS — I48.20 CHRONIC ATRIAL FIBRILLATION (HCC): ICD-10-CM

## 2019-01-29 DIAGNOSIS — I50.32 CHRONIC HEART FAILURE WITH NORMAL EJECTION FRACTION (HCC): Primary | ICD-10-CM

## 2019-01-29 DIAGNOSIS — N18.30 CKD (CHRONIC KIDNEY DISEASE) STAGE 3, GFR 30-59 ML/MIN (HCC): ICD-10-CM

## 2019-01-29 LAB
ANION GAP SERPL CALC-SCNC: 6 MMOL/L (ref 0–18)
BUN BLD-MCNC: 29 MG/DL (ref 8–20)
BUN/CREAT SERPL: 24.4 (ref 10–20)
CALCIUM BLD-MCNC: 9.2 MG/DL (ref 8.3–10.3)
CHLORIDE SERPL-SCNC: 109 MMOL/L (ref 101–111)
CO2 SERPL-SCNC: 25 MMOL/L (ref 22–32)
CREAT BLD-MCNC: 1.19 MG/DL (ref 0.55–1.02)
GLUCOSE BLD-MCNC: 109 MG/DL (ref 70–99)
OSMOLALITY SERPL CALC.SUM OF ELEC: 296 MOSM/KG (ref 275–295)
POTASSIUM SERPL-SCNC: 4 MMOL/L (ref 3.6–5.1)
SODIUM SERPL-SCNC: 140 MMOL/L (ref 136–144)

## 2019-01-29 PROCEDURE — 99215 OFFICE O/P EST HI 40 MIN: CPT | Performed by: NURSE PRACTITIONER

## 2019-01-29 NOTE — PROGRESS NOTES
Pt. Assessed. Pt. c/o some sob, however, it is improved. Weight 138.2 lbs which is unchanged from last visit. APN notified of pt's c/o. Labs ordered. Reviewed allergies and list of current medications with pt. And updated it int he EMR.  IV established per

## 2019-01-29 NOTE — PROGRESS NOTES
[facility] Cardiac Health Progress Note    Tea Gastelum is a 80year old female who presents to clinic for APN assessment and management of chronic HFpEF/RV heart failure and is functional class 2-3.      Subjective:  She returns for rou Rfl: 6  •  torsemide 20 MG Oral Tab, Take 20 mg by mouth 2 (two) times daily.   , Disp: , Rfl:   •  losartan 100 MG Oral Tab, Take by mouth nightly., Disp: , Rfl:   •  Metoprolol Succinate  MG Oral Tablet 24 Hr, Take 200 mg by mouth nightly., Disp: , prothesis, with normal function.   8. Vitamin D deficiency - on supplement. 9. Remote Hx GIB 2007 - denies melena. 10. HLD    Plan:   · Continue current medications. · Venofer 200mg IVP x 1, dose 2/3. · Return to clinic in 1 month.   · RN visit for last

## 2019-02-04 ENCOUNTER — HOSPITAL ENCOUNTER (OUTPATIENT)
Dept: CARDIOLOGY CLINIC | Facility: HOSPITAL | Age: 84
Discharge: HOME | End: 2019-02-04
Attending: NURSE PRACTITIONER
Payer: MEDICARE

## 2019-02-04 VITALS — HEART RATE: 80 BPM | WEIGHT: 136.38 LBS | DIASTOLIC BLOOD PRESSURE: 47 MMHG | SYSTOLIC BLOOD PRESSURE: 131 MMHG

## 2019-02-04 PROCEDURE — 99212 OFFICE O/P EST SF 10 MIN: CPT

## 2019-02-04 NOTE — PROGRESS NOTES
RN visit only for IV Venofer 200mg dose 3/3. No APN visit.     DX: Elida Byrne, RALPH  2/4/2019  2:23 PM  [address]

## 2019-02-04 NOTE — PROGRESS NOTES
Pt here for RN visit only. 22G IV inserted into the Right upper arm without difficulty. IV Venofer 200mg infused over 5 minutes. IV removed, catheter tip intact. Vitals Stable.

## 2019-02-28 VITALS
HEIGHT: 62 IN | HEART RATE: 86 BPM | WEIGHT: 141 LBS | SYSTOLIC BLOOD PRESSURE: 124 MMHG | BODY MASS INDEX: 25.95 KG/M2 | DIASTOLIC BLOOD PRESSURE: 58 MMHG

## 2019-02-28 VITALS
SYSTOLIC BLOOD PRESSURE: 112 MMHG | BODY MASS INDEX: 25.95 KG/M2 | HEART RATE: 76 BPM | HEIGHT: 62 IN | WEIGHT: 141 LBS | DIASTOLIC BLOOD PRESSURE: 54 MMHG

## 2019-02-28 VITALS
SYSTOLIC BLOOD PRESSURE: 142 MMHG | WEIGHT: 135 LBS | HEART RATE: 78 BPM | HEIGHT: 62 IN | BODY MASS INDEX: 24.84 KG/M2 | DIASTOLIC BLOOD PRESSURE: 68 MMHG

## 2019-02-28 VITALS — SYSTOLIC BLOOD PRESSURE: 128 MMHG | WEIGHT: 138 LBS | HEART RATE: 80 BPM | DIASTOLIC BLOOD PRESSURE: 58 MMHG

## 2019-03-01 VITALS
WEIGHT: 144 LBS | BODY MASS INDEX: 26.5 KG/M2 | SYSTOLIC BLOOD PRESSURE: 140 MMHG | HEART RATE: 86 BPM | HEIGHT: 62 IN | DIASTOLIC BLOOD PRESSURE: 70 MMHG

## 2019-03-01 VITALS
WEIGHT: 140 LBS | SYSTOLIC BLOOD PRESSURE: 132 MMHG | DIASTOLIC BLOOD PRESSURE: 60 MMHG | HEIGHT: 62 IN | HEART RATE: 60 BPM | BODY MASS INDEX: 25.76 KG/M2

## 2019-03-01 VITALS
HEART RATE: 61 BPM | DIASTOLIC BLOOD PRESSURE: 60 MMHG | HEIGHT: 62 IN | OXYGEN SATURATION: 95 % | SYSTOLIC BLOOD PRESSURE: 162 MMHG | WEIGHT: 142 LBS | BODY MASS INDEX: 26.13 KG/M2

## 2019-03-04 ENCOUNTER — HOSPITAL ENCOUNTER (OUTPATIENT)
Dept: CARDIOLOGY CLINIC | Facility: HOSPITAL | Age: 84
Discharge: HOME | End: 2019-03-04
Attending: NURSE PRACTITIONER
Payer: MEDICARE

## 2019-03-04 ENCOUNTER — ANTI-COAG (OUTPATIENT)
Dept: CARDIOLOGY | Age: 84
End: 2019-03-04

## 2019-03-04 ENCOUNTER — HOSPITAL ENCOUNTER (OUTPATIENT)
Dept: LAB | Facility: HOSPITAL | Age: 84
Discharge: HOME | End: 2019-03-04
Attending: NURSE PRACTITIONER
Payer: MEDICARE

## 2019-03-04 ENCOUNTER — PRIOR ORIGINAL RECORDS (OUTPATIENT)
Dept: OTHER | Age: 84
End: 2019-03-04

## 2019-03-04 ENCOUNTER — HOSPITAL ENCOUNTER (OUTPATIENT)
Dept: LAB | Facility: HOSPITAL | Age: 84
Discharge: HOME | End: 2019-03-04
Attending: INTERNAL MEDICINE
Payer: MEDICARE

## 2019-03-04 VITALS
HEART RATE: 77 BPM | DIASTOLIC BLOOD PRESSURE: 51 MMHG | SYSTOLIC BLOOD PRESSURE: 136 MMHG | WEIGHT: 134 LBS | OXYGEN SATURATION: 96 % | RESPIRATION RATE: 18 BRPM

## 2019-03-04 DIAGNOSIS — I50.32 CHRONIC HEART FAILURE WITH NORMAL EJECTION FRACTION (HCC): Primary | ICD-10-CM

## 2019-03-04 DIAGNOSIS — I50.9 CHF (CONGESTIVE HEART FAILURE) (HCC): ICD-10-CM

## 2019-03-04 DIAGNOSIS — I48.91 ATRIAL FIBRILLATION, UNSPECIFIED TYPE (CMD): ICD-10-CM

## 2019-03-04 DIAGNOSIS — I27.20 PULMONARY HYPERTENSION (HCC): ICD-10-CM

## 2019-03-04 DIAGNOSIS — E55.9 VITAMIN D DEFICIENCY: ICD-10-CM

## 2019-03-04 DIAGNOSIS — I48.91 ATRIAL FIBRILLATION (HCC): ICD-10-CM

## 2019-03-04 DIAGNOSIS — N18.30 CKD (CHRONIC KIDNEY DISEASE) STAGE 3, GFR 30-59 ML/MIN (HCC): ICD-10-CM

## 2019-03-04 DIAGNOSIS — Z98.890 HX OF MITRAL VALVE REPAIR: ICD-10-CM

## 2019-03-04 DIAGNOSIS — Z95.1 HX OF CABG: ICD-10-CM

## 2019-03-04 DIAGNOSIS — I48.20 CHRONIC ATRIAL FIBRILLATION (HCC): ICD-10-CM

## 2019-03-04 DIAGNOSIS — D50.9 IRON DEFICIENCY ANEMIA, UNSPECIFIED IRON DEFICIENCY ANEMIA TYPE: ICD-10-CM

## 2019-03-04 DIAGNOSIS — I10 ESSENTIAL HYPERTENSION: ICD-10-CM

## 2019-03-04 LAB
ANION GAP SERPL CALC-SCNC: 6 MMOL/L (ref 0–18)
BUN BLD-MCNC: 38 MG/DL (ref 7–18)
BUN/CREAT SERPL: 26.6 (ref 10–20)
CALCIUM BLD-MCNC: 9.7 MG/DL (ref 8.5–10.1)
CHLORIDE SERPL-SCNC: 106 MMOL/L (ref 98–107)
CO2 SERPL-SCNC: 27 MMOL/L (ref 21–32)
CREAT BLD-MCNC: 1.43 MG/DL (ref 0.55–1.02)
GLUCOSE BLD-MCNC: 139 MG/DL (ref 70–99)
INR BLD: 2.08 (ref 0.9–1.1)
INR: NORMAL
OSMOLALITY SERPL CALC.SUM OF ELEC: 299 MOSM/KG (ref 275–295)
POTASSIUM SERPL-SCNC: 4.9 MMOL/L (ref 3.5–5.1)
PSA SERPL DL<=0.01 NG/ML-MCNC: 24.7 SECONDS (ref 12.5–14.7)
SODIUM SERPL-SCNC: 139 MMOL/L (ref 136–145)

## 2019-03-04 PROCEDURE — 80048 BASIC METABOLIC PNL TOTAL CA: CPT | Performed by: NURSE PRACTITIONER

## 2019-03-04 PROCEDURE — 99215 OFFICE O/P EST HI 40 MIN: CPT | Performed by: NURSE PRACTITIONER

## 2019-03-04 PROCEDURE — 85610 PROTHROMBIN TIME: CPT | Performed by: NURSE PRACTITIONER

## 2019-03-04 PROCEDURE — 85610 PROTHROMBIN TIME: CPT

## 2019-03-04 PROCEDURE — 36415 COLL VENOUS BLD VENIPUNCTURE: CPT | Performed by: NURSE PRACTITIONER

## 2019-03-04 RX ORDER — TORSEMIDE 20 MG/1
TABLET ORAL
Refills: 0 | Status: SHIPPED | COMMUNITY
Start: 2019-03-04 | End: 2019-04-08

## 2019-03-04 NOTE — PROGRESS NOTES
[facility] Cardiac Health Progress Note    Wang Leal is a 80year old female who presents to clinic for APN assessment and management of chronic HFpEF/RV heart failure and is functional class 3.      Subjective:  She returns for routgrace Range: 21.0 - 32.0 mmol/L 27.0   BUN Latest Ref Range: 7 - 18 mg/dL 38 (H)   CREATININE Latest Ref Range: 0.55 - 1.02 mg/dL 1.43 (H)   CALCIUM Latest Ref Range: 8.5 - 10.1 mg/dL 9.7   BUN/CREAT Ratio Latest Ref Range: 10.0 - 20.0  26.6 (H)   GFR, Non-Afric exacerbation and when to call APN/clinic. Assessment:   1. Chronic HFpEF/RV failure - last LVEF 55-60% on echo with mild-Mod TR, mildly dehydrated. 2.  pHTN - last PAS 60mmhg on echo, RV function moderately reduced, mild to moderate TR.   3. CKD stage 3

## 2019-03-04 NOTE — PROGRESS NOTES
Pt assessed. C/o fatigue and SOB with minimal activity which is not new for her. Denies abdominal bloating or more edema. Weight down 2 lbs at 134 lbs. Reviewed current list of patient's allergies and medication; updated EMR. Labs ordered.  Reviewed follow-

## 2019-03-06 PROBLEM — I48.91 UNSPECIFIED ATRIAL FIBRILLATION (CMD): Status: ACTIVE | Noted: 2019-03-06

## 2019-03-20 ENCOUNTER — TELEPHONE (OUTPATIENT)
Dept: CARDIOLOGY CLINIC | Facility: HOSPITAL | Age: 84
End: 2019-03-20

## 2019-03-20 NOTE — PROGRESS NOTES
Received call from pt's daughter Mj Magana stating that patient was not doing too well. Pt is very SOB with activity but breathing is ok at rest. She is more tired and keeps \"nodding off\". Pt's weight is stable and has actually gone down.  Vy Estrella does

## 2019-03-21 ENCOUNTER — HOSPITAL ENCOUNTER (OUTPATIENT)
Dept: GENERAL RADIOLOGY | Facility: HOSPITAL | Age: 84
Discharge: HOME | End: 2019-03-21
Attending: NURSE PRACTITIONER
Payer: MEDICARE

## 2019-03-21 ENCOUNTER — HOSPITAL ENCOUNTER (OUTPATIENT)
Dept: CARDIOLOGY CLINIC | Facility: HOSPITAL | Age: 84
Discharge: HOME | End: 2019-03-21
Attending: NURSE PRACTITIONER
Payer: MEDICARE

## 2019-03-21 ENCOUNTER — ANTI-COAG (OUTPATIENT)
Dept: CARDIOLOGY | Age: 84
End: 2019-03-21

## 2019-03-21 VITALS
HEART RATE: 98 BPM | SYSTOLIC BLOOD PRESSURE: 148 MMHG | WEIGHT: 134.88 LBS | RESPIRATION RATE: 30 BRPM | OXYGEN SATURATION: 100 % | DIASTOLIC BLOOD PRESSURE: 65 MMHG | TEMPERATURE: 98 F

## 2019-03-21 DIAGNOSIS — D50.9 IRON DEFICIENCY ANEMIA, UNSPECIFIED IRON DEFICIENCY ANEMIA TYPE: ICD-10-CM

## 2019-03-21 DIAGNOSIS — R50.9 FEVER, UNSPECIFIED FEVER CAUSE: ICD-10-CM

## 2019-03-21 DIAGNOSIS — I48.20 CHRONIC ATRIAL FIBRILLATION (HCC): ICD-10-CM

## 2019-03-21 DIAGNOSIS — I48.91 ATRIAL FIBRILLATION, UNSPECIFIED TYPE (CMD): ICD-10-CM

## 2019-03-21 DIAGNOSIS — R06.02 SHORTNESS OF BREATH ON EXERTION: ICD-10-CM

## 2019-03-21 DIAGNOSIS — I27.20 PULMONARY HYPERTENSION (HCC): ICD-10-CM

## 2019-03-21 DIAGNOSIS — N18.30 CKD (CHRONIC KIDNEY DISEASE) STAGE 3, GFR 30-59 ML/MIN (HCC): ICD-10-CM

## 2019-03-21 DIAGNOSIS — I50.33 ACUTE ON CHRONIC HEART FAILURE WITH NORMAL EJECTION FRACTION (HCC): Primary | ICD-10-CM

## 2019-03-21 LAB
ANION GAP SERPL CALC-SCNC: 7 MMOL/L (ref 0–18)
ANION GAP SERPL CALC-SCNC: 9 MMOL/L (ref 0–18)
BUN BLD-MCNC: 32 MG/DL (ref 7–18)
BUN BLD-MCNC: 36 MG/DL (ref 7–18)
BUN/CREAT SERPL: 25 (ref 10–20)
BUN/CREAT SERPL: 27.9 (ref 10–20)
CALCIUM BLD-MCNC: 8.7 MG/DL (ref 8.5–10.1)
CALCIUM BLD-MCNC: 9.2 MG/DL (ref 8.5–10.1)
CHLORIDE SERPL-SCNC: 105 MMOL/L (ref 98–107)
CHLORIDE SERPL-SCNC: 107 MMOL/L (ref 98–107)
CO2 SERPL-SCNC: 23 MMOL/L (ref 21–32)
CO2 SERPL-SCNC: 23 MMOL/L (ref 21–32)
CREAT BLD-MCNC: 1.28 MG/DL (ref 0.55–1.02)
CREAT BLD-MCNC: 1.29 MG/DL (ref 0.55–1.02)
DEPRECATED HBV CORE AB SER IA-ACNC: 137 NG/ML (ref 18–340)
DEPRECATED RDW RBC AUTO: 46.4 FL (ref 35.1–46.3)
ERYTHROCYTE [DISTWIDTH] IN BLOOD BY AUTOMATED COUNT: 13.6 % (ref 11–15)
GLUCOSE BLD-MCNC: 113 MG/DL (ref 70–99)
GLUCOSE BLD-MCNC: 115 MG/DL (ref 70–99)
HCT VFR BLD AUTO: 31.7 % (ref 35–48)
HGB BLD-MCNC: 10.3 G/DL (ref 12–16)
INR BLD: 2.6 (ref 0.9–1.1)
INR PPP: 2.6
IRON SATURATION: 5 % (ref 15–50)
IRON SERPL-MCNC: 14 UG/DL (ref 50–170)
MCH RBC QN AUTO: 30.2 PG (ref 26–34)
MCHC RBC AUTO-ENTMCNC: 32.5 G/DL (ref 31–37)
MCV RBC AUTO: 93 FL (ref 80–100)
OSMOLALITY SERPL CALC.SUM OF ELEC: 289 MOSM/KG (ref 275–295)
OSMOLALITY SERPL CALC.SUM OF ELEC: 296 MOSM/KG (ref 275–295)
PLATELET # BLD AUTO: 304 10(3)UL (ref 150–450)
POTASSIUM SERPL-SCNC: 4 MMOL/L (ref 3.5–5.1)
PROCALCITONIN SERPL-MCNC: 0.08 NG/ML
PSA SERPL DL<=0.01 NG/ML-MCNC: 29.6 SECONDS (ref 12.5–14.7)
RBC # BLD AUTO: 3.41 X10(6)UL (ref 3.8–5.3)
SODIUM SERPL-SCNC: 135 MMOL/L (ref 136–145)
SODIUM SERPL-SCNC: 139 MMOL/L (ref 136–145)
TOTAL IRON BINDING CAPACITY: 295 UG/DL (ref 240–450)
TOTAL IRON BINDING CAPACITY: 328 UG/DL (ref 240–450)
TRANSFERRIN SERPL-MCNC: 198 MG/DL (ref 200–360)
TRANSFERRIN SERPL-MCNC: 220 MG/DL (ref 200–360)
WBC # BLD AUTO: 9.6 X10(3) UL (ref 4–11)

## 2019-03-21 PROCEDURE — 99215 OFFICE O/P EST HI 40 MIN: CPT | Performed by: NURSE PRACTITIONER

## 2019-03-21 PROCEDURE — 71045 X-RAY EXAM CHEST 1 VIEW: CPT | Performed by: NURSE PRACTITIONER

## 2019-03-21 RX ORDER — BUMETANIDE 0.25 MG/ML
2 INJECTION, SOLUTION INTRAMUSCULAR; INTRAVENOUS ONCE
Status: COMPLETED | OUTPATIENT
Start: 2019-03-21 | End: 2019-03-21

## 2019-03-21 RX ADMIN — BUMETANIDE 2 MG: 0.25 INJECTION, SOLUTION INTRAMUSCULAR; INTRAVENOUS at 18:09:00

## 2019-03-21 NOTE — PROGRESS NOTES
Patient assessed. No C/O of Bilateral leg edema, but very SOB. Weight at 134.9 lbs, Up slightly. Reviewed current list of patient's allergies and medication; updated EMR. BMP, CBC, INR, Iron study done. Due to SOB, Xray done. IV established per protocol.  2

## 2019-03-21 NOTE — PROGRESS NOTES
[facility] Cardiac Health Progress Note    Ollie Gomez is a 80year old female who presents to clinic for APN assessment and management of chronic HFpEF/RV and is functional class 3-4.      Subjective:  She returns for routine assessmen 91-92% on RA    Lab Results   Component Value Date    WBC 9.6 03/21/2019    HGB 10.3 03/21/2019    HCT 31.7 03/21/2019    .0 03/21/2019    CREATSERUM 1.28 03/21/2019    BUN 32 03/21/2019     03/21/2019    K 4.0 03/21/2019     03/21/2019 lung bases likely atelectasis versus pneumonia in the appropriate clinical scenario. Assessment:   1. Chronic HFpEF/RV failure - last LVEF 55-60% on echo with mild-Mod TR, euvolemic on exam with stable weight. 2.  pHTN - last PAS 60mmhg on echo, RV func

## 2019-04-01 ENCOUNTER — HOSPITAL ENCOUNTER (OUTPATIENT)
Dept: CARDIOLOGY CLINIC | Facility: HOSPITAL | Age: 84
Discharge: HOME | End: 2019-04-01
Attending: NURSE PRACTITIONER
Payer: MEDICARE

## 2019-04-01 ENCOUNTER — ANTI-COAG (OUTPATIENT)
Dept: CARDIOLOGY | Age: 84
End: 2019-04-01

## 2019-04-01 VITALS
OXYGEN SATURATION: 87 % | DIASTOLIC BLOOD PRESSURE: 66 MMHG | HEART RATE: 81 BPM | WEIGHT: 135.19 LBS | SYSTOLIC BLOOD PRESSURE: 104 MMHG

## 2019-04-01 DIAGNOSIS — I50.33 ACUTE ON CHRONIC HEART FAILURE WITH NORMAL EJECTION FRACTION (HCC): Primary | ICD-10-CM

## 2019-04-01 DIAGNOSIS — D50.9 IRON DEFICIENCY ANEMIA, UNSPECIFIED IRON DEFICIENCY ANEMIA TYPE: ICD-10-CM

## 2019-04-01 DIAGNOSIS — I48.20 CHRONIC ATRIAL FIBRILLATION (HCC): ICD-10-CM

## 2019-04-01 DIAGNOSIS — R09.02 HYPOXIA: ICD-10-CM

## 2019-04-01 LAB — INR PPP: 2.8

## 2019-04-01 PROCEDURE — 99215 OFFICE O/P EST HI 40 MIN: CPT | Performed by: NURSE PRACTITIONER

## 2019-04-01 RX ORDER — BUMETANIDE 0.25 MG/ML
3 INJECTION, SOLUTION INTRAMUSCULAR; INTRAVENOUS ONCE
Status: COMPLETED | OUTPATIENT
Start: 2019-04-01 | End: 2019-04-01

## 2019-04-01 RX ADMIN — BUMETANIDE 3 MG: 0.25 INJECTION, SOLUTION INTRAMUSCULAR; INTRAVENOUS at 11:15:00

## 2019-04-01 NOTE — PROGRESS NOTES
[facility] Cardiac Health Progress Note    Jarrod Jaime is a 80year old female who presents to clinic for APN assessment and management of chronic HFpEF/RV and is functional class 3-4.      Subjective:  She returns for assessment of vol 04/01/2019     04/01/2019    CO2 23.0 04/01/2019     04/01/2019    CA 8.7 04/01/2019    INR 2.80 04/01/2019    PTP 31.4 04/01/2019         Current Outpatient Medications:   •  torsemide 20 MG Oral Tab, Take 20mg 1 tablet Shamika Mcghee Sat and Teresa 1.2-1.4 mg/dl; stable. 4. Chronic Afib - Hx Medtronic PPM with last device interrogation showing underlying afib with ventricular rates from  bpm, rates controlled today; Coumadin managed by S Coumadin clinic and INR checks at [facility]. 5.  HTN - co

## 2019-04-01 NOTE — PROGRESS NOTES
Pt. Assessed. Pt. C/o feeling sob and swelling in legs. Color is her usual.  Weight 135.7 lbs. APN notified of pt's c/o. Labs ordered. Reviewed allergies and list of current medications with pt. And updated it int he EMR. IV established per protocol.  IV v

## 2019-04-04 RX ORDER — ERGOCALCIFEROL 1.25 MG/1
CAPSULE ORAL
COMMUNITY

## 2019-04-04 RX ORDER — TORSEMIDE 20 MG/1
60 TABLET ORAL
COMMUNITY
Start: 2018-07-23

## 2019-04-04 RX ORDER — WARFARIN SODIUM 5 MG/1
TABLET ORAL
COMMUNITY
Start: 2015-12-29 | End: 2020-01-01

## 2019-04-04 RX ORDER — AMLODIPINE BESYLATE 5 MG/1
TABLET ORAL
COMMUNITY
Start: 2018-07-23 | End: 2019-08-09 | Stop reason: SDUPTHER

## 2019-04-04 RX ORDER — METOPROLOL SUCCINATE 200 MG/1
TABLET, EXTENDED RELEASE ORAL
COMMUNITY
End: 2019-10-14 | Stop reason: SDUPTHER

## 2019-04-04 RX ORDER — SPIRONOLACTONE 25 MG/1
TABLET ORAL
COMMUNITY
Start: 2018-11-07

## 2019-04-04 RX ORDER — LOSARTAN POTASSIUM 100 MG/1
TABLET ORAL
COMMUNITY
Start: 2018-11-27 | End: 2019-07-10 | Stop reason: SDUPTHER

## 2019-04-08 ENCOUNTER — HOSPITAL ENCOUNTER (OUTPATIENT)
Dept: CARDIOLOGY CLINIC | Facility: HOSPITAL | Age: 84
Discharge: HOME | End: 2019-04-08
Attending: NURSE PRACTITIONER
Payer: MEDICARE

## 2019-04-08 ENCOUNTER — ANTI-COAG (OUTPATIENT)
Dept: CARDIOLOGY | Age: 84
End: 2019-04-08

## 2019-04-08 ENCOUNTER — ANCILLARY ORDERS (OUTPATIENT)
Dept: CARDIOLOGY | Age: 84
End: 2019-04-08

## 2019-04-08 ENCOUNTER — ANCILLARY PROCEDURE (OUTPATIENT)
Dept: CARDIOLOGY | Age: 84
End: 2019-04-08
Attending: INTERNAL MEDICINE

## 2019-04-08 VITALS
HEART RATE: 78 BPM | OXYGEN SATURATION: 90 % | RESPIRATION RATE: 13 BRPM | WEIGHT: 135 LBS | SYSTOLIC BLOOD PRESSURE: 116 MMHG | DIASTOLIC BLOOD PRESSURE: 62 MMHG

## 2019-04-08 DIAGNOSIS — I48.91 ATRIAL FIBRILLATION, UNSPECIFIED TYPE (CMD): ICD-10-CM

## 2019-04-08 DIAGNOSIS — I50.810 RIGHT HEART FAILURE, NYHA CLASS 3 (HCC): ICD-10-CM

## 2019-04-08 DIAGNOSIS — I50.33 ACUTE ON CHRONIC HEART FAILURE WITH NORMAL EJECTION FRACTION (HCC): Primary | ICD-10-CM

## 2019-04-08 DIAGNOSIS — I48.20 CHRONIC ATRIAL FIBRILLATION (HCC): ICD-10-CM

## 2019-04-08 LAB — INR PPP: 3.3

## 2019-04-08 PROCEDURE — 93294 REM INTERROG EVL PM/LDLS PM: CPT | Performed by: INTERNAL MEDICINE

## 2019-04-08 PROCEDURE — 99214 OFFICE O/P EST MOD 30 MIN: CPT | Performed by: NURSE PRACTITIONER

## 2019-04-08 RX ORDER — BUMETANIDE 0.25 MG/ML
4 INJECTION, SOLUTION INTRAMUSCULAR; INTRAVENOUS ONCE
Status: COMPLETED | OUTPATIENT
Start: 2019-04-08 | End: 2019-04-08

## 2019-04-08 RX ORDER — TORSEMIDE 20 MG/1
40 TABLET ORAL DAILY
Qty: 60 TABLET | Refills: 3 | Status: SHIPPED | COMMUNITY
Start: 2019-04-08 | End: 2019-04-29

## 2019-04-08 RX ADMIN — BUMETANIDE 4 MG: 0.25 INJECTION, SOLUTION INTRAMUSCULAR; INTRAVENOUS at 12:15:00

## 2019-04-08 NOTE — PROGRESS NOTES
INR results called to [facility] coumadin clinic. Instructions placed in AVS and given verbally to pt.

## 2019-04-08 NOTE — PROGRESS NOTES
Pt. assessed. Pt. c/o sob and LE edema and APN notified. Weight stable at 135.0 lbs. Labs ordered and drawn in the [facility]. Reviewed allergies and list of current medications with pt. and updated it in the EMR. IV established in the [facility] per protocol.  IV venofe

## 2019-04-08 NOTE — PROGRESS NOTES
[facility] Cardiac Health Progress Note    Eneida Mitchell is a 80year old female who presents to clinic for APN assessment and management of chronic HFpEF/RV and is functional class 3-4.      Subjective:  She returns for assessment of vol Tab, Take 20mg 1 tablet Tues, Thurs Sat and Sun. Take 20mg twice per day on Mon,Weds and Fri., Disp: , Rfl: 0  •  spironolactone 25 MG Oral Tab, Take 0.5 tablets (12.5 mg total) by mouth daily. (Patient taking differently: Take 12.5 mg by mouth daily.  Take Coumadin clinic and INR checks at [facility]. 5. HTN - controlled on current meds. 6. CAD - hx CABG; last stress test 2017 and normal. Denies angina. 7.  Hx MV repair 2003, replacement 2005 - echo 7/2018 with well seated prothesis, with normal function.   8

## 2019-04-15 ENCOUNTER — HOSPITAL ENCOUNTER (OUTPATIENT)
Dept: CARDIOLOGY CLINIC | Facility: HOSPITAL | Age: 84
Discharge: HOME | End: 2019-04-15
Attending: NURSE PRACTITIONER
Payer: MEDICARE

## 2019-04-15 ENCOUNTER — ANTI-COAG (OUTPATIENT)
Dept: CARDIOLOGY | Age: 84
End: 2019-04-15

## 2019-04-15 VITALS
HEART RATE: 64 BPM | SYSTOLIC BLOOD PRESSURE: 128 MMHG | RESPIRATION RATE: 23 BRPM | OXYGEN SATURATION: 94 % | DIASTOLIC BLOOD PRESSURE: 43 MMHG | WEIGHT: 133.81 LBS

## 2019-04-15 DIAGNOSIS — I50.33 ACUTE ON CHRONIC HEART FAILURE WITH NORMAL EJECTION FRACTION (HCC): ICD-10-CM

## 2019-04-15 DIAGNOSIS — I50.810 RIGHT HEART FAILURE, NYHA CLASS 3 (HCC): Primary | ICD-10-CM

## 2019-04-15 DIAGNOSIS — I48.91 ATRIAL FIBRILLATION, UNSPECIFIED TYPE (CMD): ICD-10-CM

## 2019-04-15 DIAGNOSIS — I48.20 CHRONIC ATRIAL FIBRILLATION (HCC): ICD-10-CM

## 2019-04-15 LAB — INR PPP: 2.32

## 2019-04-15 PROCEDURE — 99214 OFFICE O/P EST MOD 30 MIN: CPT | Performed by: NURSE PRACTITIONER

## 2019-04-15 RX ORDER — BUMETANIDE 0.25 MG/ML
1 INJECTION, SOLUTION INTRAMUSCULAR; INTRAVENOUS ONCE
Status: COMPLETED | OUTPATIENT
Start: 2019-04-15 | End: 2019-04-15

## 2019-04-15 RX ORDER — AMLODIPINE BESYLATE 5 MG/1
5 TABLET ORAL DAILY
Refills: 0 | Status: SHIPPED | COMMUNITY
Start: 2019-04-15

## 2019-04-15 RX ADMIN — BUMETANIDE 1 MG: 0.25 INJECTION, SOLUTION INTRAMUSCULAR; INTRAVENOUS at 12:50:00

## 2019-04-15 NOTE — PROGRESS NOTES
[facility] Cardiac Health Progress Note    Wang Leal is a 80year old female who presents to clinic for APN assessment and management of chronic HFpEF/RV and is functional class 3-4.      Subjective:  She returns for assessment of vol 04/15/2019    INR 2.32 04/15/2019    PTP 27.0 04/15/2019         Current Outpatient Medications:   •  torsemide 20 MG Oral Tab, Take 2 tablets (40 mg total) by mouth daily. , Disp: 60 tablet, Rfl: 3  •  spironolactone 25 MG Oral Tab, Take 0.5 tablets (12.5 underlying afib with ventricular rates from  bpm, rates controlled today; Coumadin managed by S Coumadin clinic and INR checks at [facility]. 5.  HTN - controlled on current meds with low DBP's.   6. CAD - hx CABG; last stress test 2017 and normal. Lory Hughes

## 2019-04-15 NOTE — PROGRESS NOTES
Patient assessed. No C/O of Bilateral leg edema. Weight at 133.7 lbs. Reviewed current list of patient's allergies and medication; updated EMR. BMP, INR drawn, IV established per protocol. IV venofer (5/5) given per order. 1 mg of IV bumex given per order.

## 2019-04-29 ENCOUNTER — TELEPHONE (OUTPATIENT)
Dept: CARDIOLOGY CLINIC | Facility: HOSPITAL | Age: 84
End: 2019-04-29

## 2019-04-29 ENCOUNTER — HOSPITAL ENCOUNTER (OUTPATIENT)
Dept: CARDIOLOGY CLINIC | Facility: HOSPITAL | Age: 84
Discharge: HOME | End: 2019-04-29
Attending: NURSE PRACTITIONER
Payer: MEDICARE

## 2019-04-29 ENCOUNTER — ANTI-COAG (OUTPATIENT)
Dept: CARDIOLOGY | Age: 84
End: 2019-04-29

## 2019-04-29 VITALS
SYSTOLIC BLOOD PRESSURE: 131 MMHG | OXYGEN SATURATION: 95 % | WEIGHT: 128.19 LBS | RESPIRATION RATE: 26 BRPM | DIASTOLIC BLOOD PRESSURE: 75 MMHG | HEART RATE: 73 BPM

## 2019-04-29 DIAGNOSIS — E55.9 VITAMIN D DEFICIENCY: ICD-10-CM

## 2019-04-29 DIAGNOSIS — I48.91 ATRIAL FIBRILLATION, UNSPECIFIED TYPE (CMD): ICD-10-CM

## 2019-04-29 DIAGNOSIS — I50.810 RIGHT HEART FAILURE, NYHA CLASS 3 (HCC): Primary | ICD-10-CM

## 2019-04-29 DIAGNOSIS — L29.9 PRURITIC CONDITION: ICD-10-CM

## 2019-04-29 DIAGNOSIS — I48.20 CHRONIC ATRIAL FIBRILLATION (HCC): ICD-10-CM

## 2019-04-29 DIAGNOSIS — N18.30 CKD (CHRONIC KIDNEY DISEASE) STAGE 3, GFR 30-59 ML/MIN (HCC): ICD-10-CM

## 2019-04-29 DIAGNOSIS — I27.20 PULMONARY HYPERTENSION (HCC): ICD-10-CM

## 2019-04-29 LAB — INR PPP: 2.74

## 2019-04-29 PROCEDURE — 99214 OFFICE O/P EST MOD 30 MIN: CPT | Performed by: NURSE PRACTITIONER

## 2019-04-29 RX ORDER — DIPHENHYDRAMINE HCL 25 MG
25 CAPSULE ORAL ONCE
Status: DISCONTINUED | OUTPATIENT
Start: 2019-04-29 | End: 2019-05-01

## 2019-04-29 RX ORDER — TORSEMIDE 20 MG/1
20 TABLET ORAL DAILY
Qty: 60 TABLET | Refills: 3 | Status: SHIPPED | COMMUNITY
Start: 2019-04-29 | End: 2019-11-06

## 2019-04-29 NOTE — PROGRESS NOTES
Pt assessed. C/o itching for about 1 week now. Initially thought it could be due to a change in  for Toprol. But itching did not subside when they reverted back to original .  Because of the itching, pt has been holding Spironolacton

## 2019-04-29 NOTE — PROGRESS NOTES
[facility] Cardiac Health Progress Note    Terrence Rod is a 80year old female who presents to clinic for APN assessment and management of chronic HFpEF/RV and is functional class 3-4.      Subjective:  She returns for assessment of vol 04/29/19  0956   *   BUN 51*   CREATSERUM 1.62*   GFRAA 31*   GFRNAA 27*   CA 9.3      K 4.0      CO2 25.0     Component      Latest Ref Rng & Units 4/29/2019   AST (SGOT)      15 - 37 U/L 19   ALT (SGPT)      13 - 56 U/L 17   ALKALINE P foods, fluid restriction, daily weights, medication regimen, s/s HF exacerbation and when to call APN/clinic. Assessment:   1. Acute HFpEF/RV failure - last LVEF 55-60% on echo, volume status improved.    2. PH - last PAS 60 mmhg on echo, RV function mod

## 2019-05-06 ENCOUNTER — TELEPHONE (OUTPATIENT)
Dept: CARDIOLOGY CLINIC | Facility: HOSPITAL | Age: 84
End: 2019-05-06

## 2019-05-06 NOTE — PROGRESS NOTES
Called pt. For a weight check. Pt. Staed,\"My breathignis better andmy weight is the same at 130lbs. \" Pt. Denies edema. Also asked pt.about having her blood drawn. Pt. Stated her granddaughter is taking her tomorrow for her blood draw. Great.  Will wait

## 2019-05-07 LAB — INR PPP: 3.2

## 2019-05-08 ENCOUNTER — ANTI-COAG (OUTPATIENT)
Dept: CARDIOLOGY | Age: 84
End: 2019-05-08

## 2019-05-08 DIAGNOSIS — I48.91 ATRIAL FIBRILLATION, UNSPECIFIED TYPE (CMD): ICD-10-CM

## 2019-05-09 ENCOUNTER — TELEPHONE (OUTPATIENT)
Dept: CARDIOLOGY CLINIC | Facility: HOSPITAL | Age: 84
End: 2019-05-09

## 2019-05-09 NOTE — PROGRESS NOTES
Na 145  K 4.7  BUN 38  Creatinine 1.3    Iron Sat 15%, improved from 5%   Ferritin 189    HGB 11.6       TSH 3.81    Kidney function stable. HGB improved.

## 2019-05-13 ENCOUNTER — ANTI-COAG (OUTPATIENT)
Dept: CARDIOLOGY | Age: 84
End: 2019-05-13

## 2019-05-13 ENCOUNTER — HOSPITAL ENCOUNTER (OUTPATIENT)
Dept: CARDIOLOGY CLINIC | Facility: HOSPITAL | Age: 84
Discharge: HOME | End: 2019-05-13
Attending: NURSE PRACTITIONER
Payer: MEDICARE

## 2019-05-13 ENCOUNTER — TELEPHONE (OUTPATIENT)
Dept: CARDIOLOGY CLINIC | Facility: HOSPITAL | Age: 84
End: 2019-05-13

## 2019-05-13 ENCOUNTER — HOSPITAL ENCOUNTER (OUTPATIENT)
Dept: LAB | Facility: HOSPITAL | Age: 84
Discharge: HOME | End: 2019-05-13
Attending: NURSE PRACTITIONER
Payer: MEDICARE

## 2019-05-13 VITALS
SYSTOLIC BLOOD PRESSURE: 139 MMHG | DIASTOLIC BLOOD PRESSURE: 51 MMHG | OXYGEN SATURATION: 93 % | WEIGHT: 130.31 LBS | HEART RATE: 74 BPM

## 2019-05-13 DIAGNOSIS — Z95.1 HX OF CABG: ICD-10-CM

## 2019-05-13 DIAGNOSIS — L29.9 PRURITIC CONDITION: ICD-10-CM

## 2019-05-13 DIAGNOSIS — I50.9 CHF (CONGESTIVE HEART FAILURE) (HCC): ICD-10-CM

## 2019-05-13 DIAGNOSIS — I27.20 PULMONARY HYPERTENSION (HCC): ICD-10-CM

## 2019-05-13 DIAGNOSIS — D50.9 IRON DEFICIENCY ANEMIA, UNSPECIFIED IRON DEFICIENCY ANEMIA TYPE: ICD-10-CM

## 2019-05-13 DIAGNOSIS — I50.32 CHRONIC HEART FAILURE WITH NORMAL EJECTION FRACTION (HCC): Primary | ICD-10-CM

## 2019-05-13 DIAGNOSIS — I10 ESSENTIAL HYPERTENSION: ICD-10-CM

## 2019-05-13 DIAGNOSIS — I48.91 ATRIAL FIBRILLATION, UNSPECIFIED TYPE (CMD): ICD-10-CM

## 2019-05-13 DIAGNOSIS — I48.20 CHRONIC ATRIAL FIBRILLATION (HCC): ICD-10-CM

## 2019-05-13 DIAGNOSIS — N18.30 CKD (CHRONIC KIDNEY DISEASE) STAGE 3, GFR 30-59 ML/MIN (HCC): ICD-10-CM

## 2019-05-13 DIAGNOSIS — Z87.19 H/O: GI BLEED: ICD-10-CM

## 2019-05-13 LAB — INR PPP: 2.95

## 2019-05-13 PROCEDURE — 99215 OFFICE O/P EST HI 40 MIN: CPT | Performed by: NURSE PRACTITIONER

## 2019-05-13 PROCEDURE — 85610 PROTHROMBIN TIME: CPT | Performed by: NURSE PRACTITIONER

## 2019-05-13 PROCEDURE — 36415 COLL VENOUS BLD VENIPUNCTURE: CPT | Performed by: NURSE PRACTITIONER

## 2019-05-13 PROCEDURE — 80048 BASIC METABOLIC PNL TOTAL CA: CPT | Performed by: NURSE PRACTITIONER

## 2019-05-13 NOTE — PROGRESS NOTES
[facility] Cardiac Health Progress Note    Aye Mercedes is a 80year old female who presents to clinic for APN assessment and management of chronic HFpEF/RV heart failure and is functional class 3.      Subjective:  [facility]: 7 - 18 mg/dL 24 (H)   CREATININE Latest Ref Range: 0.55 - 1.02 mg/dL 1.20 (H)   CALCIUM Latest Ref Range: 8.5 - 10.1 mg/dL 9.4   BUN/CREAT Ratio Latest Ref Range: 10.0 - 20.0  20.0   GFR, Non-African American Latest Ref Range: >=60  38 (L medication regimen, s/s HF exacerbation and when to call APN/clinic. Assessment:   1.  Chronic HFpEF/RV failure - last LVEF 55-60% on echo, euvolemic on exam.  2. PH - last PAS 60 mmhg on echo, RV function moderately reduced, mild to moderate TR.   3. CK

## 2019-05-13 NOTE — PROGRESS NOTES
Pt. Assessed. No s/s of shortness of breath, fatigue, chest pain or edema noted. Weight stable at 130.8___ lbs. Reviewed current list of patient's allergies and medication; updated EMR. Labs ordered to assess kidney function. Pt's daughter stated th ept.  Jamarcus Gomez

## 2019-05-24 ENCOUNTER — ANTI-COAG (OUTPATIENT)
Dept: CARDIOLOGY | Age: 84
End: 2019-05-24

## 2019-05-24 DIAGNOSIS — I48.91 ATRIAL FIBRILLATION, UNSPECIFIED TYPE (CMD): ICD-10-CM

## 2019-05-24 LAB — INR PPP: 1.7

## 2019-06-03 ENCOUNTER — ANTI-COAG (OUTPATIENT)
Dept: CARDIOLOGY | Age: 84
End: 2019-06-03

## 2019-06-03 DIAGNOSIS — I48.91 ATRIAL FIBRILLATION, UNSPECIFIED TYPE (CMD): ICD-10-CM

## 2019-06-03 LAB — INR PPP: 2.2

## 2019-06-10 DIAGNOSIS — I50.9 CHF (CONGESTIVE HEART FAILURE) (HCC): Primary | ICD-10-CM

## 2019-06-11 ENCOUNTER — HOSPITAL ENCOUNTER (OUTPATIENT)
Dept: LAB | Facility: HOSPITAL | Age: 84
Discharge: HOME | End: 2019-06-11
Attending: NURSE PRACTITIONER
Payer: MEDICARE

## 2019-06-11 ENCOUNTER — HOSPITAL ENCOUNTER (OUTPATIENT)
Dept: CARDIOLOGY CLINIC | Facility: HOSPITAL | Age: 84
Discharge: HOME | End: 2019-06-11
Attending: NURSE PRACTITIONER
Payer: MEDICARE

## 2019-06-11 ENCOUNTER — ANTI-COAG (OUTPATIENT)
Dept: CARDIOLOGY | Age: 84
End: 2019-06-11

## 2019-06-11 VITALS
DIASTOLIC BLOOD PRESSURE: 52 MMHG | WEIGHT: 132.38 LBS | RESPIRATION RATE: 14 BRPM | SYSTOLIC BLOOD PRESSURE: 123 MMHG | HEART RATE: 70 BPM | OXYGEN SATURATION: 90 %

## 2019-06-11 DIAGNOSIS — I27.20 PULMONARY HYPERTENSION (HCC): ICD-10-CM

## 2019-06-11 DIAGNOSIS — Z98.890 HX OF MITRAL VALVE REPAIR: ICD-10-CM

## 2019-06-11 DIAGNOSIS — I10 ESSENTIAL HYPERTENSION: ICD-10-CM

## 2019-06-11 DIAGNOSIS — I48.20 CHRONIC ATRIAL FIBRILLATION (HCC): ICD-10-CM

## 2019-06-11 DIAGNOSIS — I50.9 CHF (CONGESTIVE HEART FAILURE) (HCC): ICD-10-CM

## 2019-06-11 DIAGNOSIS — D50.9 IRON DEFICIENCY ANEMIA, UNSPECIFIED IRON DEFICIENCY ANEMIA TYPE: ICD-10-CM

## 2019-06-11 DIAGNOSIS — Z95.1 HX OF CABG: ICD-10-CM

## 2019-06-11 DIAGNOSIS — I48.91 ATRIAL FIBRILLATION, UNSPECIFIED TYPE (CMD): ICD-10-CM

## 2019-06-11 DIAGNOSIS — N18.30 CKD (CHRONIC KIDNEY DISEASE) STAGE 3, GFR 30-59 ML/MIN (HCC): ICD-10-CM

## 2019-06-11 DIAGNOSIS — I50.32 CHRONIC HEART FAILURE WITH NORMAL EJECTION FRACTION (HCC): Primary | ICD-10-CM

## 2019-06-11 LAB — INR PPP: 1.61

## 2019-06-11 PROCEDURE — 83880 ASSAY OF NATRIURETIC PEPTIDE: CPT | Performed by: NURSE PRACTITIONER

## 2019-06-11 PROCEDURE — 80048 BASIC METABOLIC PNL TOTAL CA: CPT | Performed by: NURSE PRACTITIONER

## 2019-06-11 PROCEDURE — 99215 OFFICE O/P EST HI 40 MIN: CPT | Performed by: NURSE PRACTITIONER

## 2019-06-11 PROCEDURE — 36415 COLL VENOUS BLD VENIPUNCTURE: CPT | Performed by: NURSE PRACTITIONER

## 2019-06-11 PROCEDURE — 85610 PROTHROMBIN TIME: CPT | Performed by: NURSE PRACTITIONER

## 2019-06-11 NOTE — PROGRESS NOTES
[facility] Cardiac Health Progress Note    Sharon Maria is a 80year old female who presents to clinic for APN assessment and management of chronic HFpEF/RV heart failure and is functional class 3.      Subjective:  She returns for [facility] Ratio Latest Ref Range: 10.0 - 20.0  22.0 (H)   GFR, Non- Latest Ref Range: >=60  34 (L)   GFR, -American Latest Ref Range: >=60  39 (L)   ANION GAP Latest Ref Range: 0 - 18 mmol/L 6   CALCULATED OSMOLALITY Latest Ref Range: 275 - 29 echo - close to euvolemic on exam.  2. PH - last PAS 60 mmhg on echo, RV function moderately reduced, mild to moderate TR.   3. CKD stage 3 - baseline creatinine 1.2-1.4 mg/dl; stable.   4. Chronic Afib - Hx Medtronic PPM with last device interrogation show

## 2019-06-11 NOTE — PROGRESS NOTES
Pt. assessed. No s/s of shortness of breath, chest pain, or edema noted. Pt reports having an increased appetite and increased fatigue with APN notified of pt's report. Weight up 2 lbs at 132.4 lbs.  Reviewed current list of patient's allergies and medicat

## 2019-07-02 ENCOUNTER — ANTI-COAG (OUTPATIENT)
Dept: CARDIOLOGY | Age: 84
End: 2019-07-02

## 2019-07-02 DIAGNOSIS — I48.91 ATRIAL FIBRILLATION, UNSPECIFIED TYPE (CMD): ICD-10-CM

## 2019-07-03 LAB — INR PPP: 1.3

## 2019-07-09 ENCOUNTER — ANCILLARY PROCEDURE (OUTPATIENT)
Dept: CARDIOLOGY | Age: 84
End: 2019-07-09
Attending: INTERNAL MEDICINE

## 2019-07-09 ENCOUNTER — ANCILLARY ORDERS (OUTPATIENT)
Dept: CARDIOLOGY | Age: 84
End: 2019-07-09

## 2019-07-09 DIAGNOSIS — Z95.0 CARDIAC PACEMAKER: ICD-10-CM

## 2019-07-10 RX ORDER — LOSARTAN POTASSIUM 100 MG/1
TABLET ORAL
Qty: 90 TABLET | Refills: 0 | Status: SHIPPED | OUTPATIENT
Start: 2019-07-10 | End: 2019-10-22 | Stop reason: SDUPTHER

## 2019-07-11 PROCEDURE — 93294 REM INTERROG EVL PM/LDLS PM: CPT | Performed by: INTERNAL MEDICINE

## 2019-07-24 ENCOUNTER — TELEPHONE (OUTPATIENT)
Dept: CARDIOLOGY CLINIC | Facility: HOSPITAL | Age: 84
End: 2019-07-24

## 2019-07-24 NOTE — PROGRESS NOTES
Pt's daughter Anushka Penny called to cancel pt's next [facility] appointment on August 8th. Per Taco Magallanes, pt's appointment with Dr. Jorge Kingston was moved to early August. RN attempted to re-schedule [facility] appointment but Taco Magallanes said they will call us.  Call back schedul

## 2019-07-30 ENCOUNTER — ANTI-COAG (OUTPATIENT)
Dept: CARDIOLOGY | Age: 84
End: 2019-07-30

## 2019-07-30 DIAGNOSIS — I48.91 ATRIAL FIBRILLATION, UNSPECIFIED TYPE (CMD): ICD-10-CM

## 2019-08-02 ENCOUNTER — TELEPHONE (OUTPATIENT)
Dept: CARDIOLOGY CLINIC | Facility: HOSPITAL | Age: 84
End: 2019-08-02

## 2019-08-02 NOTE — PROGRESS NOTES
This patient was scheduled to be called from the [facility] today to follow up on making her next appointment. The patient requested that I call her daughter, Enriqueta Dobson, since she drives her to appointments.   A message was left for Sherylemmanuel Olya on her voicemail earlier t

## 2019-08-06 SDOH — HEALTH STABILITY: MENTAL HEALTH: HOW OFTEN DO YOU HAVE A DRINK CONTAINING ALCOHOL?: NEVER

## 2019-08-07 ENCOUNTER — OFFICE VISIT (OUTPATIENT)
Dept: CARDIOLOGY | Age: 84
End: 2019-08-07

## 2019-08-07 VITALS
HEART RATE: 83 BPM | WEIGHT: 132 LBS | BODY MASS INDEX: 24.14 KG/M2 | DIASTOLIC BLOOD PRESSURE: 58 MMHG | SYSTOLIC BLOOD PRESSURE: 132 MMHG

## 2019-08-07 DIAGNOSIS — E55.9 VITAMIN D DEFICIENCY: ICD-10-CM

## 2019-08-07 DIAGNOSIS — I50.9 HEART FAILURE, UNSPECIFIED HF CHRONICITY, UNSPECIFIED HEART FAILURE TYPE (CMD): ICD-10-CM

## 2019-08-07 DIAGNOSIS — I25.119 CORONARY ARTERY DISEASE INVOLVING NATIVE CORONARY ARTERY OF NATIVE HEART WITH ANGINA PECTORIS (CMD): ICD-10-CM

## 2019-08-07 DIAGNOSIS — I10 ESSENTIAL HYPERTENSION: ICD-10-CM

## 2019-08-07 DIAGNOSIS — I48.91 ATRIAL FIBRILLATION, UNSPECIFIED TYPE (CMD): Primary | ICD-10-CM

## 2019-08-07 DIAGNOSIS — E78.5 DYSLIPIDEMIA: ICD-10-CM

## 2019-08-07 DIAGNOSIS — D53.8 OTHER SPECIFIED NUTRITIONAL ANEMIAS: ICD-10-CM

## 2019-08-07 DIAGNOSIS — R73.9 HYPERGLYCEMIA: ICD-10-CM

## 2019-08-07 PROCEDURE — 99214 OFFICE O/P EST MOD 30 MIN: CPT | Performed by: INTERNAL MEDICINE

## 2019-08-07 SDOH — HEALTH STABILITY: MENTAL HEALTH: HOW OFTEN DO YOU HAVE A DRINK CONTAINING ALCOHOL?: NEVER

## 2019-08-07 ASSESSMENT — ENCOUNTER SYMPTOMS
ALLERGIC/IMMUNOLOGIC COMMENTS: NO NEW FOOD ALLERGIES
FEVER: 0
HEMATOCHEZIA: 0
CHILLS: 0
BRUISES/BLEEDS EASILY: 0
SUSPICIOUS LESIONS: 0
COUGH: 0
WEIGHT GAIN: 0
HEMOPTYSIS: 0
WEIGHT LOSS: 0

## 2019-08-09 ENCOUNTER — ANTI-COAG (OUTPATIENT)
Dept: CARDIOLOGY | Age: 84
End: 2019-08-09

## 2019-08-09 DIAGNOSIS — I48.91 ATRIAL FIBRILLATION, UNSPECIFIED TYPE (CMD): ICD-10-CM

## 2019-08-09 LAB — INR PPP: 4.2

## 2019-08-09 RX ORDER — AMLODIPINE BESYLATE 5 MG/1
TABLET ORAL
Qty: 180 TABLET | Refills: 3 | Status: SHIPPED | OUTPATIENT
Start: 2019-08-09 | End: 2019-11-06 | Stop reason: ALTCHOICE

## 2019-08-16 ENCOUNTER — ANTI-COAG (OUTPATIENT)
Dept: CARDIOLOGY | Age: 84
End: 2019-08-16

## 2019-08-16 DIAGNOSIS — I48.91 ATRIAL FIBRILLATION, UNSPECIFIED TYPE (CMD): ICD-10-CM

## 2019-08-16 LAB — INR PPP: 2

## 2019-09-09 RX ORDER — TORSEMIDE 20 MG/1
20 TABLET ORAL DAILY
Qty: 30 TABLET | Refills: 5 | Status: SHIPPED | OUTPATIENT
Start: 2019-09-09 | End: 2019-11-06

## 2019-09-13 ENCOUNTER — ANTI-COAG (OUTPATIENT)
Dept: CARDIOLOGY | Age: 84
End: 2019-09-13

## 2019-09-13 DIAGNOSIS — I48.91 ATRIAL FIBRILLATION, UNSPECIFIED TYPE (CMD): ICD-10-CM

## 2019-09-13 DIAGNOSIS — I50.9 CHF (CONGESTIVE HEART FAILURE) (HCC): Primary | ICD-10-CM

## 2019-09-16 ENCOUNTER — ANTI-COAG (OUTPATIENT)
Dept: CARDIOLOGY | Age: 84
End: 2019-09-16

## 2019-09-16 ENCOUNTER — HOSPITAL ENCOUNTER (OUTPATIENT)
Dept: LAB | Facility: HOSPITAL | Age: 84
Discharge: HOME | End: 2019-09-16
Attending: NURSE PRACTITIONER
Payer: MEDICARE

## 2019-09-16 ENCOUNTER — HOSPITAL ENCOUNTER (OUTPATIENT)
Dept: CARDIOLOGY CLINIC | Facility: HOSPITAL | Age: 84
Discharge: HOME | End: 2019-09-16
Attending: NURSE PRACTITIONER
Payer: MEDICARE

## 2019-09-16 VITALS
DIASTOLIC BLOOD PRESSURE: 54 MMHG | SYSTOLIC BLOOD PRESSURE: 139 MMHG | RESPIRATION RATE: 18 BRPM | HEART RATE: 75 BPM | WEIGHT: 132.38 LBS | OXYGEN SATURATION: 95 %

## 2019-09-16 DIAGNOSIS — Z98.890 HX OF MITRAL VALVE REPAIR: ICD-10-CM

## 2019-09-16 DIAGNOSIS — I48.91 ATRIAL FIBRILLATION, UNSPECIFIED TYPE (CMD): ICD-10-CM

## 2019-09-16 DIAGNOSIS — N18.30 CKD (CHRONIC KIDNEY DISEASE) STAGE 3, GFR 30-59 ML/MIN (HCC): ICD-10-CM

## 2019-09-16 DIAGNOSIS — I48.20 CHRONIC ATRIAL FIBRILLATION (HCC): ICD-10-CM

## 2019-09-16 DIAGNOSIS — D50.9 IRON DEFICIENCY ANEMIA, UNSPECIFIED IRON DEFICIENCY ANEMIA TYPE: ICD-10-CM

## 2019-09-16 DIAGNOSIS — I50.9 CHF (CONGESTIVE HEART FAILURE) (HCC): ICD-10-CM

## 2019-09-16 DIAGNOSIS — I50.810 RIGHT HEART FAILURE, NYHA CLASS 3 (HCC): Primary | ICD-10-CM

## 2019-09-16 DIAGNOSIS — I27.20 PULMONARY HYPERTENSION (HCC): ICD-10-CM

## 2019-09-16 LAB
ANION GAP SERPL CALC-SCNC: 8 MMOL/L (ref 0–18)
BUN BLD-MCNC: 36 MG/DL (ref 7–18)
BUN/CREAT SERPL: 29.8 (ref 10–20)
CALCIUM BLD-MCNC: 9.5 MG/DL (ref 8.5–10.1)
CHLORIDE SERPL-SCNC: 110 MMOL/L (ref 98–112)
CO2 SERPL-SCNC: 24 MMOL/L (ref 21–32)
CREAT BLD-MCNC: 1.21 MG/DL (ref 0.55–1.02)
DEPRECATED RDW RBC AUTO: 49.6 FL (ref 35.1–46.3)
ERYTHROCYTE [DISTWIDTH] IN BLOOD BY AUTOMATED COUNT: 14.6 % (ref 11–15)
GLUCOSE BLD-MCNC: 118 MG/DL (ref 70–99)
HCT VFR BLD AUTO: 37.9 % (ref 35–48)
HGB BLD-MCNC: 12 G/DL (ref 12–16)
INR BLD: 1.82 (ref 0.9–1.1)
INR PPP: 1.82
MCH RBC QN AUTO: 29.7 PG (ref 26–34)
MCHC RBC AUTO-ENTMCNC: 31.7 G/DL (ref 31–37)
MCV RBC AUTO: 93.8 FL (ref 80–100)
NT-PROBNP SERPL-MCNC: 2727 PG/ML (ref ?–450)
OSMOLALITY SERPL CALC.SUM OF ELEC: 303 MOSM/KG (ref 275–295)
PLATELET # BLD AUTO: 177 10(3)UL (ref 150–450)
POTASSIUM SERPL-SCNC: 4.8 MMOL/L (ref 3.5–5.1)
PSA SERPL DL<=0.01 NG/ML-MCNC: 22.1 SECONDS (ref 12.5–14.7)
RBC # BLD AUTO: 4.04 X10(6)UL (ref 3.8–5.3)
SODIUM SERPL-SCNC: 142 MMOL/L (ref 136–145)
WBC # BLD AUTO: 7.5 X10(3) UL (ref 4–11)

## 2019-09-16 PROCEDURE — 99214 OFFICE O/P EST MOD 30 MIN: CPT | Performed by: NURSE PRACTITIONER

## 2019-09-16 PROCEDURE — 85027 COMPLETE CBC AUTOMATED: CPT | Performed by: NURSE PRACTITIONER

## 2019-09-16 PROCEDURE — 85610 PROTHROMBIN TIME: CPT | Performed by: NURSE PRACTITIONER

## 2019-09-16 PROCEDURE — 83880 ASSAY OF NATRIURETIC PEPTIDE: CPT | Performed by: NURSE PRACTITIONER

## 2019-09-16 PROCEDURE — 80048 BASIC METABOLIC PNL TOTAL CA: CPT | Performed by: NURSE PRACTITIONER

## 2019-09-16 PROCEDURE — 36415 COLL VENOUS BLD VENIPUNCTURE: CPT | Performed by: NURSE PRACTITIONER

## 2019-09-16 NOTE — PATIENT INSTRUCTIONS
Heart Failure Discharge Instructions    Per the Coumadin Clinic, take 5 mg of Coumadin tonight only, then continue with usual dosing tomorrow and have INR rechecked in 2 weeks. Take an extra Torsemide 20 mg the next 2 days.      Activity: Regular exerc pounds overnight or 3-5 pounds in 3-7 days  · You have more trouble breathing  · You get more tired with regular activity, or are limiting activity because of shortness of breath or fatigue  · You are short of breath lying down, you need more pillows to br

## 2019-09-16 NOTE — PROGRESS NOTES
Pt. assessed. No s/s of shortness of breath, fatigue, chest pain or edema noted. Weight stable at 132.4 lbs. Reviewed current list of patient's allergies and medication; updated EMR. Labs ordered to assess kidney function. INR of 1.82 was called into [facility]

## 2019-09-16 NOTE — PROGRESS NOTES
[facility] Cardiac Health Progress Note    Rebeca Lema is a 80year old female who presents to clinic for APN assessment and management of chronic HFpEF/RV heart failure and is functional class 3.      Subjective:  She returns for asses by mouth daily. , Disp: 60 tablet, Rfl: 3  •  amLODIPine Besylate 5 MG Oral Tab, Take 1 tablet (5 mg total) by mouth daily. , Disp: , Rfl: 0  •  spironolactone 25 MG Oral Tab, Take 0.5 tablets (12.5 mg total) by mouth daily. (Patient taking differently:  Take echo 7/2018 with well seated prothesis, with normal function.   8. Vitamin D deficiency - on supplement. 25-OH 70. 3.    9. Remote Hx GIB 2007   10. HLD  11. Anemia, iron deficiency - Hx IV Venofer, last dose 4/8.  HBG now normal. Avoid IV Venofer in the fut

## 2019-10-02 ENCOUNTER — ANTI-COAG (OUTPATIENT)
Dept: CARDIOLOGY | Age: 84
End: 2019-10-02

## 2019-10-02 DIAGNOSIS — I48.91 ATRIAL FIBRILLATION, UNSPECIFIED TYPE (CMD): ICD-10-CM

## 2019-10-02 LAB — INR PPP: 3

## 2019-10-15 RX ORDER — METOPROLOL SUCCINATE 200 MG/1
TABLET, EXTENDED RELEASE ORAL
Qty: 90 TABLET | Refills: 1 | Status: SHIPPED | OUTPATIENT
Start: 2019-10-15 | End: 2020-03-13

## 2019-10-16 ENCOUNTER — ANCILLARY PROCEDURE (OUTPATIENT)
Dept: CARDIOLOGY | Age: 84
End: 2019-10-16
Attending: INTERNAL MEDICINE

## 2019-10-16 ENCOUNTER — ANCILLARY ORDERS (OUTPATIENT)
Dept: CARDIOLOGY | Age: 84
End: 2019-10-16

## 2019-10-16 DIAGNOSIS — Z95.0 PACEMAKER: ICD-10-CM

## 2019-10-16 PROCEDURE — 93294 REM INTERROG EVL PM/LDLS PM: CPT | Performed by: INTERNAL MEDICINE

## 2019-10-16 PROCEDURE — X1114 CARDIAC DEVICE HOME CHECK - REMOTE UNSCHEDULED: HCPCS | Performed by: INTERNAL MEDICINE

## 2019-10-17 DIAGNOSIS — I50.9 CHF (CONGESTIVE HEART FAILURE) (HCC): Primary | ICD-10-CM

## 2019-10-18 ENCOUNTER — HOSPITAL ENCOUNTER (OUTPATIENT)
Dept: LAB | Facility: HOSPITAL | Age: 84
Discharge: HOME | End: 2019-10-18
Attending: NURSE PRACTITIONER
Payer: MEDICARE

## 2019-10-18 ENCOUNTER — TELEPHONE (OUTPATIENT)
Dept: CARDIOLOGY CLINIC | Facility: HOSPITAL | Age: 84
End: 2019-10-18

## 2019-10-18 ENCOUNTER — HOSPITAL ENCOUNTER (OUTPATIENT)
Dept: CARDIOLOGY CLINIC | Facility: HOSPITAL | Age: 84
Discharge: HOME | End: 2019-10-18
Attending: NURSE PRACTITIONER
Payer: MEDICARE

## 2019-10-18 ENCOUNTER — ANTI-COAG (OUTPATIENT)
Dept: CARDIOLOGY | Age: 84
End: 2019-10-18

## 2019-10-18 VITALS
WEIGHT: 134.81 LBS | SYSTOLIC BLOOD PRESSURE: 111 MMHG | RESPIRATION RATE: 24 BRPM | DIASTOLIC BLOOD PRESSURE: 81 MMHG | HEART RATE: 69 BPM

## 2019-10-18 DIAGNOSIS — I50.812 CHRONIC RIGHT-SIDED CONGESTIVE HEART FAILURE (HCC): Primary | ICD-10-CM

## 2019-10-18 DIAGNOSIS — I50.9 CHF (CONGESTIVE HEART FAILURE) (HCC): ICD-10-CM

## 2019-10-18 DIAGNOSIS — N18.30 CKD (CHRONIC KIDNEY DISEASE) STAGE 3, GFR 30-59 ML/MIN (HCC): ICD-10-CM

## 2019-10-18 DIAGNOSIS — D50.9 IRON DEFICIENCY ANEMIA, UNSPECIFIED IRON DEFICIENCY ANEMIA TYPE: ICD-10-CM

## 2019-10-18 DIAGNOSIS — I10 ESSENTIAL HYPERTENSION: ICD-10-CM

## 2019-10-18 DIAGNOSIS — I48.20 CHRONIC ATRIAL FIBRILLATION (HCC): ICD-10-CM

## 2019-10-18 DIAGNOSIS — I27.20 PULMONARY HYPERTENSION (HCC): ICD-10-CM

## 2019-10-18 DIAGNOSIS — Z95.1 HX OF CABG: ICD-10-CM

## 2019-10-18 DIAGNOSIS — I48.91 ATRIAL FIBRILLATION, UNSPECIFIED TYPE (CMD): ICD-10-CM

## 2019-10-18 LAB — INR PPP: 2.43

## 2019-10-18 PROCEDURE — 99214 OFFICE O/P EST MOD 30 MIN: CPT | Performed by: NURSE PRACTITIONER

## 2019-10-18 PROCEDURE — 36415 COLL VENOUS BLD VENIPUNCTURE: CPT | Performed by: NURSE PRACTITIONER

## 2019-10-18 PROCEDURE — 85610 PROTHROMBIN TIME: CPT | Performed by: NURSE PRACTITIONER

## 2019-10-18 PROCEDURE — 80048 BASIC METABOLIC PNL TOTAL CA: CPT | Performed by: NURSE PRACTITIONER

## 2019-10-18 RX ORDER — SPIRONOLACTONE 25 MG/1
25 TABLET ORAL DAILY
Qty: 30 TABLET | Refills: 6 | Status: SHIPPED | OUTPATIENT
Start: 2019-10-18 | End: 2019-11-06

## 2019-10-18 RX ORDER — BUMETANIDE 0.25 MG/ML
2 INJECTION, SOLUTION INTRAMUSCULAR; INTRAVENOUS ONCE
Status: COMPLETED | OUTPATIENT
Start: 2019-10-18 | End: 2019-10-18

## 2019-10-18 RX ADMIN — BUMETANIDE 2 MG: 0.25 INJECTION, SOLUTION INTRAMUSCULAR; INTRAVENOUS at 12:18:00

## 2019-10-18 NOTE — PROGRESS NOTES
Patient was assessed. No s/s of chest pain or edema noted. Weight up about 2 lbs at134.8 lbs. Reviewed current list of patient's allergies and medication; updated EMR. IV established per protocol. 2 mg of Bumex was given per order.  Patient Tolerated it well

## 2019-10-18 NOTE — PROGRESS NOTES
[facility] Cardiac Health Progress Note    Ollie Gomez is a 80year old female who presents to clinic for APN assessment and management of chronic HFpEF/RV heart failure and is functional class 3.      Subjective:  She returns for RUSTgrace by mouth daily. Take 30 mins before your Torsemide, Disp: 30 tablet, Rfl: 6  •  torsemide 20 MG Oral Tab, Take 1 tablet (20 mg total) by mouth daily. , Disp: 30 tablet, Rfl: 5  •  torsemide 20 MG Oral Tab, Take 1 tablet (20 mg total) by mouth daily. , Disp: echo 7/2018 with well seated prothesis, with normal function.   8. Vitamin D deficiency - on supplement. 25-OH 70. 3.    9. Remote Hx GIB 2007   10. HLD  11. Anemia, iron deficiency - Hx IV Venofer, last dose 4/8.  HBG now normal. Avoid IV Venofer in the fut

## 2019-10-18 NOTE — PATIENT INSTRUCTIONS
Heart Failure Discharge Instructions    INR=2.4 Take the same dosage of Coumadin 2.5 mg M,W, and F, all other days take 5 mg  Of Coumadin, Re check INR in a month 11/18/219. · Increase Spironolactone from 12.5mg daily to 25mg daily.   · Please have your bl you have any of these signs or symptoms:  · You gain 2 or more pounds overnight or 3-5 pounds in 3-7 days  · You have more trouble breathing  · You get more tired with regular activity, or are limiting activity because of shortness of breath or fatigue  ·

## 2019-10-23 RX ORDER — LOSARTAN POTASSIUM 100 MG/1
TABLET ORAL
Qty: 90 TABLET | Refills: 3 | Status: SHIPPED | OUTPATIENT
Start: 2019-10-23 | End: 2020-01-01

## 2019-10-25 ENCOUNTER — TELEPHONE (OUTPATIENT)
Dept: CARDIOLOGY CLINIC | Facility: HOSPITAL | Age: 84
End: 2019-10-25

## 2019-10-25 NOTE — PROGRESS NOTES
Received BMP results collected on 10/24/19 from [place] via fax. Results reviewed by Shiva SPEARS. Per APN, decrease spironolactone to 12.5 mg daily. Instructions given to pt who verbalized understanding. Left VM w/ pt's daughter Breonna Bear as well.

## 2019-10-25 NOTE — PROGRESS NOTES
Outpatient labs reviewed after increasing Spironolactone, noted ANDREW. Will reduced back her Spironolactone and recheck during her next visit in about 10 days.     Glucose 107  BUN 48  Cr 1.9  GFR 26  Na 143  K 5  Chloride 103  CO2 23   Anion gap 17  Calcium

## 2019-11-06 ENCOUNTER — HOSPITAL ENCOUNTER (OUTPATIENT)
Dept: CARDIOLOGY CLINIC | Facility: HOSPITAL | Age: 84
Discharge: HOME | End: 2019-11-06
Attending: NURSE PRACTITIONER
Payer: MEDICARE

## 2019-11-06 VITALS
DIASTOLIC BLOOD PRESSURE: 33 MMHG | RESPIRATION RATE: 18 BRPM | WEIGHT: 133.38 LBS | SYSTOLIC BLOOD PRESSURE: 136 MMHG | OXYGEN SATURATION: 98 % | HEART RATE: 71 BPM

## 2019-11-06 DIAGNOSIS — I10 ESSENTIAL HYPERTENSION: ICD-10-CM

## 2019-11-06 DIAGNOSIS — I50.810 RIGHT HEART FAILURE, NYHA CLASS 3 (HCC): ICD-10-CM

## 2019-11-06 DIAGNOSIS — N18.30 CKD (CHRONIC KIDNEY DISEASE) STAGE 3, GFR 30-59 ML/MIN (HCC): ICD-10-CM

## 2019-11-06 DIAGNOSIS — Z95.1 HX OF CABG: ICD-10-CM

## 2019-11-06 DIAGNOSIS — I48.20 CHRONIC ATRIAL FIBRILLATION (HCC): ICD-10-CM

## 2019-11-06 DIAGNOSIS — I50.812 CHRONIC RIGHT-SIDED CONGESTIVE HEART FAILURE (HCC): Primary | ICD-10-CM

## 2019-11-06 DIAGNOSIS — Z98.890 HX OF MITRAL VALVE REPAIR: ICD-10-CM

## 2019-11-06 DIAGNOSIS — D50.9 IRON DEFICIENCY ANEMIA, UNSPECIFIED IRON DEFICIENCY ANEMIA TYPE: ICD-10-CM

## 2019-11-06 DIAGNOSIS — I27.20 PULMONARY HYPERTENSION (HCC): ICD-10-CM

## 2019-11-06 PROCEDURE — 99214 OFFICE O/P EST MOD 30 MIN: CPT | Performed by: NURSE PRACTITIONER

## 2019-11-06 RX ORDER — BUMETANIDE 0.25 MG/ML
2 INJECTION, SOLUTION INTRAMUSCULAR; INTRAVENOUS ONCE
Status: COMPLETED | OUTPATIENT
Start: 2019-11-06 | End: 2019-11-06

## 2019-11-06 RX ORDER — SPIRONOLACTONE 25 MG/1
12.5 TABLET ORAL DAILY
Qty: 30 TABLET | Refills: 6 | Status: SHIPPED | COMMUNITY
Start: 2019-11-06 | End: 2020-06-08

## 2019-11-06 RX ORDER — TORSEMIDE 20 MG/1
30 TABLET ORAL DAILY
Qty: 30 TABLET | Refills: 5 | Status: SHIPPED | COMMUNITY
Start: 2019-11-06 | End: 2019-12-05

## 2019-11-06 RX ADMIN — BUMETANIDE 2 MG: 0.25 INJECTION, SOLUTION INTRAMUSCULAR; INTRAVENOUS at 15:11:00

## 2019-11-06 NOTE — PROGRESS NOTES
[facility]. Patient assessed. Pt reports feeling SOB for the past month. Pt has 1+ pitting edema to her BLE. Pt had an episode of dizziness last night that appears to be an isolated incident. Weight stable at 133. 4

## 2019-11-06 NOTE — PATIENT INSTRUCTIONS
Heart Failure Discharge Instructions    Increase Torsemide to 30 mg daily starting tomorrow. Have labs when you see Dr. Beryle Even.        Activity: Regular exercise and activity is important for your overall health and to help keep your heart strong and funct with regular activity, or are limiting activity because of shortness of breath or fatigue  · You are short of breath lying down, you need more pillows to breathe comfortably,  or wake up during the night short of breath  · You urinate less often during the

## 2019-11-13 ENCOUNTER — OFFICE VISIT (OUTPATIENT)
Dept: CARDIOLOGY | Age: 84
End: 2019-11-13

## 2019-11-13 ENCOUNTER — HOSPITAL ENCOUNTER (OUTPATIENT)
Dept: LAB | Facility: HOSPITAL | Age: 84
Discharge: HOME | End: 2019-11-13
Attending: NURSE PRACTITIONER
Payer: MEDICARE

## 2019-11-13 ENCOUNTER — ANCILLARY PROCEDURE (OUTPATIENT)
Dept: CARDIOLOGY | Age: 84
End: 2019-11-13
Attending: INTERNAL MEDICINE

## 2019-11-13 VITALS
DIASTOLIC BLOOD PRESSURE: 58 MMHG | HEART RATE: 72 BPM | BODY MASS INDEX: 23.92 KG/M2 | SYSTOLIC BLOOD PRESSURE: 146 MMHG | HEIGHT: 62 IN | WEIGHT: 130 LBS

## 2019-11-13 DIAGNOSIS — I50.812 CHRONIC RIGHT-SIDED CONGESTIVE HEART FAILURE (HCC): ICD-10-CM

## 2019-11-13 DIAGNOSIS — I48.20 CHRONIC ATRIAL FIBRILLATION (CMD): Primary | ICD-10-CM

## 2019-11-13 DIAGNOSIS — Z45.018 PACEMAKER REPROGRAMMING/CHECK: ICD-10-CM

## 2019-11-13 DIAGNOSIS — Z95.0 PRESENCE OF CARDIAC PACEMAKER: ICD-10-CM

## 2019-11-13 LAB — INR PPP: 2

## 2019-11-13 PROCEDURE — 36415 COLL VENOUS BLD VENIPUNCTURE: CPT | Performed by: NURSE PRACTITIONER

## 2019-11-13 PROCEDURE — 99203 OFFICE O/P NEW LOW 30 MIN: CPT | Performed by: INTERNAL MEDICINE

## 2019-11-13 PROCEDURE — 80048 BASIC METABOLIC PNL TOTAL CA: CPT | Performed by: NURSE PRACTITIONER

## 2019-11-13 PROCEDURE — 93279 PRGRMG DEV EVAL PM/LDLS PM: CPT | Performed by: INTERNAL MEDICINE

## 2019-11-13 PROCEDURE — 85610 PROTHROMBIN TIME: CPT | Performed by: NURSE PRACTITIONER

## 2019-11-13 SDOH — HEALTH STABILITY: MENTAL HEALTH: HOW OFTEN DO YOU HAVE A DRINK CONTAINING ALCOHOL?: NEVER

## 2019-11-13 ASSESSMENT — PATIENT HEALTH QUESTIONNAIRE - PHQ9
SUM OF ALL RESPONSES TO PHQ9 QUESTIONS 1 AND 2: 0
SUM OF ALL RESPONSES TO PHQ9 QUESTIONS 1 AND 2: 0
1. LITTLE INTEREST OR PLEASURE IN DOING THINGS: NOT AT ALL
SUM OF ALL RESPONSES TO PHQ9 QUESTIONS 1 AND 2: 0
2. FEELING DOWN, DEPRESSED OR HOPELESS: NOT AT ALL
2. FEELING DOWN, DEPRESSED OR HOPELESS: NOT AT ALL
1. LITTLE INTEREST OR PLEASURE IN DOING THINGS: NOT AT ALL

## 2019-11-29 ENCOUNTER — ANTI-COAG (OUTPATIENT)
Dept: CARDIOLOGY | Age: 84
End: 2019-11-29

## 2019-11-29 DIAGNOSIS — I48.20 CHRONIC ATRIAL FIBRILLATION (CMD): ICD-10-CM

## 2019-12-05 ENCOUNTER — HOSPITAL ENCOUNTER (OUTPATIENT)
Dept: CARDIOLOGY CLINIC | Facility: HOSPITAL | Age: 84
Discharge: HOME | End: 2019-12-05
Attending: NURSE PRACTITIONER
Payer: MEDICARE

## 2019-12-05 VITALS
OXYGEN SATURATION: 99 % | DIASTOLIC BLOOD PRESSURE: 59 MMHG | HEART RATE: 59 BPM | SYSTOLIC BLOOD PRESSURE: 124 MMHG | WEIGHT: 134 LBS | RESPIRATION RATE: 18 BRPM

## 2019-12-05 DIAGNOSIS — N18.30 CKD (CHRONIC KIDNEY DISEASE) STAGE 3, GFR 30-59 ML/MIN (HCC): ICD-10-CM

## 2019-12-05 DIAGNOSIS — I50.32 CHRONIC DIASTOLIC CONGESTIVE HEART FAILURE (HCC): Primary | ICD-10-CM

## 2019-12-05 DIAGNOSIS — I27.20 PULMONARY HYPERTENSION (HCC): ICD-10-CM

## 2019-12-05 PROCEDURE — 36415 COLL VENOUS BLD VENIPUNCTURE: CPT

## 2019-12-05 PROCEDURE — 99213 OFFICE O/P EST LOW 20 MIN: CPT

## 2019-12-05 PROCEDURE — 96374 THER/PROPH/DIAG INJ IV PUSH: CPT

## 2019-12-05 PROCEDURE — 80048 BASIC METABOLIC PNL TOTAL CA: CPT | Performed by: NURSE PRACTITIONER

## 2019-12-05 RX ORDER — BUMETANIDE 0.25 MG/ML
3 INJECTION, SOLUTION INTRAMUSCULAR; INTRAVENOUS ONCE
Status: COMPLETED | OUTPATIENT
Start: 2019-12-05 | End: 2019-12-05

## 2019-12-05 RX ORDER — TORSEMIDE 20 MG/1
40 TABLET ORAL DAILY
Qty: 30 TABLET | Refills: 5 | Status: SHIPPED | COMMUNITY
Start: 2019-12-05 | End: 2020-01-09

## 2019-12-05 RX ADMIN — BUMETANIDE 3 MG: 0.25 INJECTION, SOLUTION INTRAMUSCULAR; INTRAVENOUS at 15:45:00

## 2019-12-05 NOTE — PROGRESS NOTES
RN welcomed [facility] to the [facility]. Patient assessed. Pt reports increased SOB with activity. Pt denies SOB at rest. Pt also has a dry cough and increased fatigue. Weight stable at 134 lbs.  Reviewed current list of patient's allergies and

## 2019-12-05 NOTE — PROGRESS NOTES
[facility] Cardiac Health Progress Note    Crystal Parr is a 80year old female who presents to clinic for APN assessment and management of chronic HFpEF/RV heart failure and is functional class 3.      Subjective:  [facility] 25.0 12/05/2019     12/05/2019    CA 9.2 12/05/2019         Current Outpatient Medications:   •  torsemide 20 MG Oral Tab, Take 2 tablets (40 mg total) by mouth daily. , Disp: 30 tablet, Rfl: 5  •  spironolactone 25 MG Oral Tab, Take 0.5 table Michelle Heard. 5. HTN - controlled with lower dose Norvasc.   6. CAD - hx CABG; last stress test 2017 and normal.   7.  Hx MV repair 2003, replacement 2005 - echo 7/2018 with well seated prothesis, with normal function.   8. Vitamin D deficiency - on supplement.

## 2019-12-05 NOTE — PATIENT INSTRUCTIONS
Heart Failure Discharge Instructions    · Increase Torsemide from 30mg daily to 40mg daily. · Please repeat your lab work in about one week.     Activity: Regular exercise and activity is important for your overall health and to help keep your heart strong more tired with regular activity, or are limiting activity because of shortness of breath or fatigue  · You are short of breath lying down, you need more pillows to breathe comfortably,  or wake up during the night short of breath  · You urinate less often

## 2019-12-06 ENCOUNTER — ANTI-COAG (OUTPATIENT)
Dept: CARDIOLOGY | Age: 84
End: 2019-12-06

## 2019-12-06 DIAGNOSIS — I48.20 CHRONIC ATRIAL FIBRILLATION (CMD): ICD-10-CM

## 2019-12-12 ENCOUNTER — ANTI-COAG (OUTPATIENT)
Dept: CARDIOLOGY | Age: 84
End: 2019-12-12

## 2019-12-12 DIAGNOSIS — I48.20 CHRONIC ATRIAL FIBRILLATION (CMD): ICD-10-CM

## 2019-12-12 LAB — INR PPP: 2.7

## 2019-12-13 ENCOUNTER — TELEPHONE (OUTPATIENT)
Dept: CARDIOLOGY CLINIC | Facility: HOSPITAL | Age: 84
End: 2019-12-13

## 2019-12-13 NOTE — PROGRESS NOTES
Labs reviewed with increase in Torsemide and mild worsening in renal function/ Will continue to monitor. Will have RN call to check weights and symptoms.

## 2019-12-13 NOTE — PROGRESS NOTES
Called patient for symptom check at the request of Brandan LAKHANI and patient has mil improvement with increased Torsemide dose. Patient still has to stop when making the bed. \" I am beginning to think I am getting old. \" Verified dosage of debbie and torsemide

## 2019-12-13 NOTE — PROGRESS NOTES
Labs resulted as follows BUN 35 crt 1.7 Na 142 K 4.7. chl 105 co2 24 calcium 9.4. Reviewed by REGAN Hobbs. Noted result after increase of torsemide dose changed and discussion with patient revealed she has slight improvement with his sob This was verif

## 2020-01-01 ENCOUNTER — APPOINTMENT (OUTPATIENT)
Dept: CARDIOLOGY | Age: 85
End: 2020-01-01
Attending: INTERNAL MEDICINE

## 2020-01-01 ENCOUNTER — ANTI-COAG (OUTPATIENT)
Dept: CARDIOLOGY | Age: 85
End: 2020-01-01

## 2020-01-01 ENCOUNTER — ANCILLARY PROCEDURE (OUTPATIENT)
Dept: CARDIOLOGY | Age: 85
End: 2020-01-01
Attending: INTERNAL MEDICINE

## 2020-01-01 ENCOUNTER — EXTERNAL RECORD (OUTPATIENT)
Dept: HEALTH INFORMATION MANAGEMENT | Facility: OTHER | Age: 85
End: 2020-01-01

## 2020-01-01 ENCOUNTER — ANCILLARY ORDERS (OUTPATIENT)
Dept: CARDIOLOGY | Age: 85
End: 2020-01-01

## 2020-01-01 ENCOUNTER — OFFICE VISIT (OUTPATIENT)
Dept: CARDIOLOGY | Age: 85
End: 2020-01-01

## 2020-01-01 ENCOUNTER — TELEPHONE (OUTPATIENT)
Dept: CARDIOLOGY | Age: 85
End: 2020-01-01

## 2020-01-01 ENCOUNTER — APPOINTMENT (OUTPATIENT)
Dept: CARDIOLOGY | Age: 85
End: 2020-01-01

## 2020-01-01 VITALS
BODY MASS INDEX: 23.37 KG/M2 | DIASTOLIC BLOOD PRESSURE: 35 MMHG | HEIGHT: 62 IN | SYSTOLIC BLOOD PRESSURE: 132 MMHG | WEIGHT: 127 LBS

## 2020-01-01 DIAGNOSIS — Z95.0 CARDIAC PACEMAKER: ICD-10-CM

## 2020-01-01 DIAGNOSIS — I50.9 HEART FAILURE, UNSPECIFIED HF CHRONICITY, UNSPECIFIED HEART FAILURE TYPE (CMD): ICD-10-CM

## 2020-01-01 DIAGNOSIS — I25.119 CORONARY ARTERY DISEASE INVOLVING NATIVE CORONARY ARTERY OF NATIVE HEART WITH ANGINA PECTORIS (CMD): Primary | ICD-10-CM

## 2020-01-01 DIAGNOSIS — I48.20 CHRONIC ATRIAL FIBRILLATION (CMD): ICD-10-CM

## 2020-01-01 PROCEDURE — 99441 TELEPHONE E&M BY PHYSICIAN EST PT NOT ORIG PREV 7 DAYS 5-10 MIN: CPT | Performed by: NURSE PRACTITIONER

## 2020-01-01 PROCEDURE — X1114 CARDIAC DEVICE HOME CHECK - REMOTE UNSCHEDULED: HCPCS | Performed by: INTERNAL MEDICINE

## 2020-01-01 PROCEDURE — 93294 REM INTERROG EVL PM/LDLS PM: CPT | Performed by: INTERNAL MEDICINE

## 2020-01-01 RX ORDER — LOSARTAN POTASSIUM 100 MG/1
TABLET ORAL
Qty: 90 TABLET | Refills: 3 | Status: SHIPPED | OUTPATIENT
Start: 2020-01-01

## 2020-01-01 RX ORDER — AMLODIPINE BESYLATE 5 MG/1
5 TABLET ORAL DAILY
COMMUNITY
End: 2020-01-01

## 2020-01-01 RX ORDER — AMLODIPINE BESYLATE 5 MG/1
TABLET ORAL
Qty: 180 TABLET | Refills: 3 | Status: SHIPPED | OUTPATIENT
Start: 2020-01-01

## 2020-01-01 SDOH — HEALTH STABILITY: PHYSICAL HEALTH: ON AVERAGE, HOW MANY DAYS PER WEEK DO YOU ENGAGE IN MODERATE TO STRENUOUS EXERCISE (LIKE A BRISK WALK)?: 0 DAYS

## 2020-01-01 SDOH — HEALTH STABILITY: PHYSICAL HEALTH: ON AVERAGE, HOW MANY MINUTES DO YOU ENGAGE IN EXERCISE AT THIS LEVEL?: 0 MIN

## 2020-01-09 ENCOUNTER — ANTI-COAG (OUTPATIENT)
Dept: CARDIOLOGY | Age: 85
End: 2020-01-09

## 2020-01-09 ENCOUNTER — HOSPITAL ENCOUNTER (OUTPATIENT)
Dept: CARDIOLOGY CLINIC | Facility: HOSPITAL | Age: 85
Discharge: HOME | End: 2020-01-09
Attending: NURSE PRACTITIONER
Payer: MEDICARE

## 2020-01-09 VITALS
WEIGHT: 138.38 LBS | DIASTOLIC BLOOD PRESSURE: 62 MMHG | OXYGEN SATURATION: 84 % | RESPIRATION RATE: 18 BRPM | SYSTOLIC BLOOD PRESSURE: 151 MMHG | HEART RATE: 76 BPM

## 2020-01-09 DIAGNOSIS — R06.02 SHORTNESS OF BREATH ON EXERTION: ICD-10-CM

## 2020-01-09 DIAGNOSIS — N18.30 CKD (CHRONIC KIDNEY DISEASE) STAGE 3, GFR 30-59 ML/MIN (HCC): ICD-10-CM

## 2020-01-09 DIAGNOSIS — I48.20 CHRONIC ATRIAL FIBRILLATION (CMD): ICD-10-CM

## 2020-01-09 DIAGNOSIS — Z95.2 HISTORY OF MITRAL VALVE REPLACEMENT: ICD-10-CM

## 2020-01-09 DIAGNOSIS — Z95.1 HX OF CABG: ICD-10-CM

## 2020-01-09 DIAGNOSIS — D50.9 IRON DEFICIENCY ANEMIA, UNSPECIFIED IRON DEFICIENCY ANEMIA TYPE: ICD-10-CM

## 2020-01-09 DIAGNOSIS — I10 ESSENTIAL HYPERTENSION: ICD-10-CM

## 2020-01-09 DIAGNOSIS — I27.20 PULMONARY HYPERTENSION (HCC): ICD-10-CM

## 2020-01-09 DIAGNOSIS — I48.20 CHRONIC ATRIAL FIBRILLATION (HCC): ICD-10-CM

## 2020-01-09 DIAGNOSIS — I50.813 ACUTE ON CHRONIC RIGHT-SIDED CONGESTIVE HEART FAILURE (HCC): Primary | ICD-10-CM

## 2020-01-09 LAB
ANION GAP SERPL CALC-SCNC: 5 MMOL/L (ref 0–18)
BUN BLD-MCNC: 30 MG/DL (ref 7–18)
BUN/CREAT SERPL: 27.3 (ref 10–20)
CALCIUM BLD-MCNC: 9.3 MG/DL (ref 8.5–10.1)
CHLORIDE SERPL-SCNC: 114 MMOL/L (ref 98–112)
CO2 SERPL-SCNC: 23 MMOL/L (ref 21–32)
CREAT BLD-MCNC: 1.1 MG/DL (ref 0.55–1.02)
DEPRECATED RDW RBC AUTO: 50.2 FL (ref 35.1–46.3)
ERYTHROCYTE [DISTWIDTH] IN BLOOD BY AUTOMATED COUNT: 16.1 % (ref 11–15)
GLUCOSE BLD-MCNC: 117 MG/DL (ref 70–99)
HCT VFR BLD AUTO: 32.4 % (ref 35–48)
HGB BLD-MCNC: 9.5 G/DL (ref 12–16)
INR BLD: 4.18 (ref 0.9–1.1)
INR PPP: 4.18
MCH RBC QN AUTO: 25.2 PG (ref 26–34)
MCHC RBC AUTO-ENTMCNC: 29.3 G/DL (ref 31–37)
MCV RBC AUTO: 85.9 FL (ref 80–100)
NT-PROBNP SERPL-MCNC: 4154 PG/ML (ref ?–450)
OSMOLALITY SERPL CALC.SUM OF ELEC: 301 MOSM/KG (ref 275–295)
PATIENT FASTING Y/N/NP: NO
PLATELET # BLD AUTO: 210 10(3)UL (ref 150–450)
POTASSIUM SERPL-SCNC: 4.1 MMOL/L (ref 3.5–5.1)
PSA SERPL DL<=0.01 NG/ML-MCNC: 43.5 SECONDS (ref 12.5–14.7)
RBC # BLD AUTO: 3.77 X10(6)UL (ref 3.8–5.3)
SODIUM SERPL-SCNC: 142 MMOL/L (ref 136–145)
WBC # BLD AUTO: 8.5 X10(3) UL (ref 4–11)

## 2020-01-09 PROCEDURE — 99215 OFFICE O/P EST HI 40 MIN: CPT | Performed by: NURSE PRACTITIONER

## 2020-01-09 RX ORDER — BUMETANIDE 0.25 MG/ML
3 INJECTION, SOLUTION INTRAMUSCULAR; INTRAVENOUS ONCE
Status: COMPLETED | OUTPATIENT
Start: 2020-01-09 | End: 2020-01-09

## 2020-01-09 RX ORDER — TORSEMIDE 20 MG/1
40 TABLET ORAL DAILY
Qty: 90 TABLET | Refills: 1 | Status: SHIPPED | OUTPATIENT
Start: 2020-01-09 | End: 2020-01-20

## 2020-01-09 RX ADMIN — BUMETANIDE 3 MG: 0.25 INJECTION, SOLUTION INTRAMUSCULAR; INTRAVENOUS at 12:31:00

## 2020-01-09 NOTE — PROGRESS NOTES
[facility]  Heart Failure Clinic Progress Note    Ovidio Dye is a 80year old female who presents to clinic for APN assessment and management of chronic HFpEF/RV heart failure and is functional class 3.      Subjective:  Jessica Deras returns for routine 132 lb 6.4 oz (60.1 kg)  06/11/19 : 132 lb 6.4 oz (60.1 kg)      Lab Results   Component Value Date    WBC 8.5 01/09/2020    HGB 9.5 01/09/2020    HCT 32.4 01/09/2020    .0 01/09/2020    INR 4.18 01/09/2020    PTP 43.5 01/09/2020     ProBNP - 4,154 breath. Her ProBNP is elevated at 4,154 (highest reading to date). · PH - last PAS 60 mmhg on echo, RV function moderately reduced, mild to moderate TR. Mild fluid overload with worsening SOB for the last couple months.   · CKD stage 3 - baseline creatini

## 2020-01-09 NOTE — PROGRESS NOTES
RN welcomed [facility] to the [facility]. Patient assessed. Patient reports increased SOB with exertion only. Pt has 1+ pitting edema to her BLE which has increased markedly overnight. Pt states that she is afraid to fall.  Pt also admitted to e

## 2020-01-09 NOTE — PATIENT INSTRUCTIONS
Heart Failure Discharge Instructions    INR today is 4.18. Per the Coumadin Clinic, please hold Coumadin tonight.     2.5mg Coumadin Monday, Wednesday, Friday, and Satrudays  5mg Coumadin on Tuesday, Thursday, and Sunday  Please recheck your INR in two week this is a warning zone    Call your doctor or nurse if you have any of these signs or symptoms:  · You gain 2 or more pounds overnight or 3-5 pounds in 3-7 days  · You have more trouble breathing  · You get more tired with regular activity, or are limiting

## 2020-01-20 ENCOUNTER — TELEPHONE (OUTPATIENT)
Dept: CARDIOLOGY CLINIC | Facility: HOSPITAL | Age: 85
End: 2020-01-20

## 2020-01-20 ENCOUNTER — HOSPITAL ENCOUNTER (OUTPATIENT)
Dept: CARDIOLOGY CLINIC | Facility: HOSPITAL | Age: 85
Discharge: HOME | End: 2020-01-20
Attending: NURSE PRACTITIONER
Payer: MEDICARE

## 2020-01-20 VITALS
OXYGEN SATURATION: 92 % | WEIGHT: 135.5 LBS | DIASTOLIC BLOOD PRESSURE: 61 MMHG | SYSTOLIC BLOOD PRESSURE: 132 MMHG | RESPIRATION RATE: 16 BRPM | HEART RATE: 78 BPM

## 2020-01-20 DIAGNOSIS — I50.813 ACUTE ON CHRONIC RIGHT-SIDED CONGESTIVE HEART FAILURE (HCC): Primary | ICD-10-CM

## 2020-01-20 DIAGNOSIS — E55.9 VITAMIN D DEFICIENCY: ICD-10-CM

## 2020-01-20 DIAGNOSIS — N18.30 CKD (CHRONIC KIDNEY DISEASE) STAGE 3, GFR 30-59 ML/MIN (HCC): ICD-10-CM

## 2020-01-20 DIAGNOSIS — Z95.2 HISTORY OF MITRAL VALVE REPLACEMENT: ICD-10-CM

## 2020-01-20 DIAGNOSIS — R06.02 SHORTNESS OF BREATH ON EXERTION: ICD-10-CM

## 2020-01-20 DIAGNOSIS — I27.20 PULMONARY HYPERTENSION (HCC): ICD-10-CM

## 2020-01-20 DIAGNOSIS — I48.20 CHRONIC ATRIAL FIBRILLATION (HCC): ICD-10-CM

## 2020-01-20 DIAGNOSIS — Z95.1 HX OF CABG: ICD-10-CM

## 2020-01-20 DIAGNOSIS — I10 ESSENTIAL HYPERTENSION: ICD-10-CM

## 2020-01-20 DIAGNOSIS — Z98.890 HX OF MITRAL VALVE REPAIR: ICD-10-CM

## 2020-01-20 DIAGNOSIS — D50.9 IRON DEFICIENCY ANEMIA, UNSPECIFIED IRON DEFICIENCY ANEMIA TYPE: ICD-10-CM

## 2020-01-20 LAB
ANION GAP SERPL CALC-SCNC: 7 MMOL/L (ref 0–18)
BUN BLD-MCNC: 46 MG/DL (ref 7–18)
BUN/CREAT SERPL: 34.6 (ref 10–20)
CALCIUM BLD-MCNC: 9.5 MG/DL (ref 8.5–10.1)
CHLORIDE SERPL-SCNC: 112 MMOL/L (ref 98–112)
CO2 SERPL-SCNC: 22 MMOL/L (ref 21–32)
CREAT BLD-MCNC: 1.33 MG/DL (ref 0.55–1.02)
DEPRECATED HBV CORE AB SER IA-ACNC: 17.4 NG/ML (ref 18–340)
DEPRECATED RDW RBC AUTO: 48.2 FL (ref 35.1–46.3)
ERYTHROCYTE [DISTWIDTH] IN BLOOD BY AUTOMATED COUNT: 16 % (ref 11–15)
GLUCOSE BLD-MCNC: 109 MG/DL (ref 70–99)
HCT VFR BLD AUTO: 31.1 % (ref 35–48)
HGB BLD-MCNC: 9.3 G/DL (ref 12–16)
IRON SATURATION: 6 % (ref 15–50)
IRON SERPL-MCNC: 31 UG/DL (ref 50–170)
MCH RBC QN AUTO: 24.9 PG (ref 26–34)
MCHC RBC AUTO-ENTMCNC: 29.9 G/DL (ref 31–37)
MCV RBC AUTO: 83.2 FL (ref 80–100)
OSMOLALITY SERPL CALC.SUM OF ELEC: 304 MOSM/KG (ref 275–295)
PATIENT FASTING Y/N/NP: NO
PLATELET # BLD AUTO: 242 10(3)UL (ref 150–450)
POTASSIUM SERPL-SCNC: 4.3 MMOL/L (ref 3.5–5.1)
RBC # BLD AUTO: 3.74 X10(6)UL (ref 3.8–5.3)
SODIUM SERPL-SCNC: 141 MMOL/L (ref 136–145)
TOTAL IRON BINDING CAPACITY: 513 UG/DL (ref 240–450)
TRANSFERRIN SERPL-MCNC: 344 MG/DL (ref 200–360)
VIT D+METAB SERPL-MCNC: 66.4 NG/ML (ref 30–100)
WBC # BLD AUTO: 7.7 X10(3) UL (ref 4–11)

## 2020-01-20 PROCEDURE — 99215 OFFICE O/P EST HI 40 MIN: CPT | Performed by: NURSE PRACTITIONER

## 2020-01-20 RX ORDER — POTASSIUM CHLORIDE 20 MEQ/1
40 TABLET, EXTENDED RELEASE ORAL ONCE
Status: COMPLETED | OUTPATIENT
Start: 2020-01-20 | End: 2020-01-20

## 2020-01-20 RX ORDER — TORSEMIDE 20 MG/1
TABLET ORAL
Qty: 90 TABLET | Refills: 3 | Status: SHIPPED | OUTPATIENT
Start: 2020-01-20 | End: 2020-09-17 | Stop reason: DRUGHIGH

## 2020-01-20 RX ORDER — BUMETANIDE 0.25 MG/ML
3 INJECTION, SOLUTION INTRAMUSCULAR; INTRAVENOUS ONCE
Status: COMPLETED | OUTPATIENT
Start: 2020-01-20 | End: 2020-01-20

## 2020-01-20 RX ADMIN — BUMETANIDE 3 MG: 0.25 INJECTION, SOLUTION INTRAMUSCULAR; INTRAVENOUS at 16:45:00

## 2020-01-20 RX ADMIN — POTASSIUM CHLORIDE 40 MEQ: 20 TABLET, EXTENDED RELEASE ORAL at 16:45:00

## 2020-01-20 NOTE — PROGRESS NOTES
Patient assessed. No C/O of Bilateral leg edema but breathing heavy, SPO2 within normal limit . Weight at 135.5 lbs. Reviewed current list of patient's allergies and medication; updated EMR. BMP drawn, IV established per protocol.  250 mg of IV Diuril and

## 2020-01-20 NOTE — PATIENT INSTRUCTIONS
Heart Failure Discharge Instructions    · Continue current medications. · No more Torsemide today. · Watch for black stools.     Activity: Regular exercise and activity is important for your overall health and to help keep your heart strong and functionin regular activity, or are limiting activity because of shortness of breath or fatigue  · You are short of breath lying down, you need more pillows to breathe comfortably,  or wake up during the night short of breath  · You urinate less often during the day

## 2020-01-20 NOTE — PROGRESS NOTES
LM on designated phone number for daughter Regarding Dena's INR that was hemolyzed. Instructed them to go to lab for redraw. Left [facility] phone number for them to call with questions.

## 2020-01-20 NOTE — PROGRESS NOTES
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      Subjective:  She returns oz (60.1 kg)      Lab Results   Component Value Date    WBC 7.7 01/20/2020    HGB 9.3 01/20/2020    HCT 31.1 01/20/2020    .0 01/20/2020    CREATSERUM 1.33 01/20/2020    BUN 46 01/20/2020     01/20/2020    K 4.3 01/20/2020     01/20/2020 dry    Education:  Patient instructed regarding sodium restricted diet, low sodium foods, fluid restriction, daily weights, medication regimen, s/s HF exacerbation and when to call APN/clinic. Assessment:   1.  Chronic combined HFpEF/RV failure - last LV

## 2020-01-21 ENCOUNTER — ANTI-COAG (OUTPATIENT)
Dept: CARDIOLOGY | Age: 85
End: 2020-01-21

## 2020-01-21 ENCOUNTER — TELEPHONE (OUTPATIENT)
Dept: CARDIOLOGY CLINIC | Facility: HOSPITAL | Age: 85
End: 2020-01-21

## 2020-01-21 DIAGNOSIS — I48.20 CHRONIC ATRIAL FIBRILLATION (CMD): ICD-10-CM

## 2020-01-21 LAB — INR PPP: 2.8

## 2020-01-21 NOTE — PROGRESS NOTES
Spoke with patient and told her that she needs to get her INR before 1/23/2020. She verbalized understanding.

## 2020-01-21 NOTE — TELEPHONE ENCOUNTER
----- Message from RALPH Prescott sent at 1/21/2020  9:13 AM CST -----  Please call the patient and let her know she can continue with her current Vitamin D dose.

## 2020-02-03 ENCOUNTER — ANTI-COAG (OUTPATIENT)
Dept: CARDIOLOGY | Age: 85
End: 2020-02-03

## 2020-02-03 ENCOUNTER — HOSPITAL ENCOUNTER (OUTPATIENT)
Dept: CARDIOLOGY CLINIC | Facility: HOSPITAL | Age: 85
Discharge: HOME | End: 2020-02-03
Attending: NURSE PRACTITIONER
Payer: MEDICARE

## 2020-02-03 VITALS
DIASTOLIC BLOOD PRESSURE: 35 MMHG | TEMPERATURE: 98 F | SYSTOLIC BLOOD PRESSURE: 136 MMHG | WEIGHT: 133 LBS | OXYGEN SATURATION: 95 % | HEART RATE: 68 BPM

## 2020-02-03 DIAGNOSIS — I50.31 ACUTE DIASTOLIC HEART FAILURE (HCC): Primary | ICD-10-CM

## 2020-02-03 DIAGNOSIS — I48.20 CHRONIC ATRIAL FIBRILLATION (CMD): ICD-10-CM

## 2020-02-03 DIAGNOSIS — Z51.5 END OF LIFE CARE: ICD-10-CM

## 2020-02-03 DIAGNOSIS — I48.20 CHRONIC ATRIAL FIBRILLATION (HCC): ICD-10-CM

## 2020-02-03 DIAGNOSIS — D50.9 IRON DEFICIENCY ANEMIA, UNSPECIFIED IRON DEFICIENCY ANEMIA TYPE: ICD-10-CM

## 2020-02-03 LAB
ANION GAP SERPL CALC-SCNC: 7 MMOL/L (ref 0–18)
ANTIBODY SCREEN: NEGATIVE
BUN BLD-MCNC: 45 MG/DL (ref 7–18)
BUN/CREAT SERPL: 23.3 (ref 10–20)
CALCIUM BLD-MCNC: 9.6 MG/DL (ref 8.5–10.1)
CHLORIDE SERPL-SCNC: 106 MMOL/L (ref 98–112)
CO2 SERPL-SCNC: 24 MMOL/L (ref 21–32)
CREAT BLD-MCNC: 1.93 MG/DL (ref 0.55–1.02)
DEPRECATED RDW RBC AUTO: 51.4 FL (ref 35.1–46.3)
ERYTHROCYTE [DISTWIDTH] IN BLOOD BY AUTOMATED COUNT: 17.1 % (ref 11–15)
GLUCOSE BLD-MCNC: 121 MG/DL (ref 70–99)
HCT VFR BLD AUTO: 29.6 % (ref 35–48)
HGB BLD-MCNC: 8.8 G/DL (ref 12–16)
INR BLD: 1.76 (ref 0.9–1.1)
INR PPP: 1.7 (ref 2–3)
MCH RBC QN AUTO: 24.6 PG (ref 26–34)
MCHC RBC AUTO-ENTMCNC: 29.7 G/DL (ref 31–37)
MCV RBC AUTO: 82.9 FL (ref 80–100)
OSMOLALITY SERPL CALC.SUM OF ELEC: 297 MOSM/KG (ref 275–295)
PATIENT FASTING Y/N/NP: NO
PLATELET # BLD AUTO: 257 10(3)UL (ref 150–450)
POTASSIUM SERPL-SCNC: 4.1 MMOL/L (ref 3.5–5.1)
PSA SERPL DL<=0.01 NG/ML-MCNC: 21.5 SECONDS (ref 12.5–14.7)
RBC # BLD AUTO: 3.57 X10(6)UL (ref 3.8–5.3)
RH BLOOD TYPE: POSITIVE
SODIUM SERPL-SCNC: 137 MMOL/L (ref 136–145)
WBC # BLD AUTO: 9 X10(3) UL (ref 4–11)

## 2020-02-03 PROCEDURE — 99215 OFFICE O/P EST HI 40 MIN: CPT | Performed by: NURSE PRACTITIONER

## 2020-02-03 PROCEDURE — 30233N1 TRANSFUSION OF NONAUTOLOGOUS RED BLOOD CELLS INTO PERIPHERAL VEIN, PERCUTANEOUS APPROACH: ICD-10-PCS | Performed by: NURSE PRACTITIONER

## 2020-02-03 RX ORDER — FERROUS SULFATE 325(65) MG
325 TABLET ORAL
Qty: 30 TABLET | Refills: 2 | Status: SHIPPED | OUTPATIENT
Start: 2020-02-03 | End: 2020-07-14

## 2020-02-03 RX ORDER — BUMETANIDE 0.25 MG/ML
2 INJECTION, SOLUTION INTRAMUSCULAR; INTRAVENOUS ONCE
Status: COMPLETED | OUTPATIENT
Start: 2020-02-03 | End: 2020-02-03

## 2020-02-03 RX ADMIN — BUMETANIDE 2 MG: 0.25 INJECTION, SOLUTION INTRAMUSCULAR; INTRAVENOUS at 16:00:00

## 2020-02-03 NOTE — PROGRESS NOTES
[facility] Cardiac Health Progress Note    Korey Harper is a 80year old female who presents to clinic for APN assessment and management of chronic combined HFpEF/RV heart failure and is functional class 3.      Subjective:  She returns on file.    Social History    Tobacco Use      Smoking status: Never Smoker    Alcohol use: No    Drug use: No          Objective:    Cardiac Rhythm: Atrial fibrillation    /47 (BP Location: Left arm)   Pulse 72   Wt 133 lb (60.3 kg)     Wt Readings f daily., Disp: , Rfl: 0  •  losartan 100 MG Oral Tab, Take by mouth nightly., Disp: , Rfl:   •  Metoprolol Succinate  MG Oral Tablet 24 Hr, Take 200 mg by mouth nightly., Disp: , Rfl:   •  Warfarin Sodium 5 MG Oral Tab, Take 5 mg by mouth 4 (four) kofi 1/20.    9. Hx GIB -HGB dropping again. Plan for conservative management with no procedures. 10. HLD  11. Anemia, iron deficiency - Hx IV Venofer and pruritic rash. On oral iron. HGB continues to trend down to 8.8 today.  Will transfuse 1 unit; goal HGB >

## 2020-02-03 NOTE — PATIENT INSTRUCTIONS
Heart Failure Discharge Instructions    Start taking Ferrous Sulfate 325 mg daily. Set up [address] [place] [address] with [facility].   Take coumadin 2.5 mg on M-W-F, 5 mg on T/Th/S/S. Repeat INR next visit.       Activity: Regular exercise and activity is 3-5 pounds in 3-7 days  · You have more trouble breathing  · You get more tired with regular activity, or are limiting activity because of shortness of breath or fatigue  · You are short of breath lying down, you need more pillows to breathe comfortably,

## 2020-02-03 NOTE — PROGRESS NOTES
Pt. Assessed. Pt. c/o sob. Denies orthopnea. Uses one pillow for sleep. Color pale. Sylvia Arango approximately 2 weeks ago. Pt states she was in the kitchen and got dizzy. Her face is bruised and she hurt her left shoulder.   She also states she got hives fro

## 2020-02-04 ENCOUNTER — TELEPHONE (OUTPATIENT)
Dept: CARDIOLOGY CLINIC | Facility: HOSPITAL | Age: 85
End: 2020-02-04

## 2020-02-20 ENCOUNTER — HOSPITAL ENCOUNTER (OUTPATIENT)
Dept: CARDIOLOGY CLINIC | Facility: HOSPITAL | Age: 85
Discharge: HOME | End: 2020-02-20
Attending: NURSE PRACTITIONER
Payer: MEDICARE

## 2020-02-20 ENCOUNTER — ANTI-COAG (OUTPATIENT)
Dept: CARDIOLOGY | Age: 85
End: 2020-02-20

## 2020-02-20 VITALS
HEART RATE: 75 BPM | DIASTOLIC BLOOD PRESSURE: 45 MMHG | WEIGHT: 135.88 LBS | SYSTOLIC BLOOD PRESSURE: 118 MMHG | OXYGEN SATURATION: 97 %

## 2020-02-20 DIAGNOSIS — I27.20 PULMONARY HYPERTENSION (HCC): ICD-10-CM

## 2020-02-20 DIAGNOSIS — I48.20 CHRONIC ATRIAL FIBRILLATION (HCC): ICD-10-CM

## 2020-02-20 DIAGNOSIS — Z95.1 HX OF CABG: ICD-10-CM

## 2020-02-20 DIAGNOSIS — Z95.2 HISTORY OF MITRAL VALVE REPLACEMENT: ICD-10-CM

## 2020-02-20 DIAGNOSIS — D50.9 IRON DEFICIENCY ANEMIA, UNSPECIFIED IRON DEFICIENCY ANEMIA TYPE: ICD-10-CM

## 2020-02-20 DIAGNOSIS — I10 ESSENTIAL HYPERTENSION: ICD-10-CM

## 2020-02-20 DIAGNOSIS — I50.813 ACUTE ON CHRONIC RIGHT-SIDED CONGESTIVE HEART FAILURE (HCC): Primary | ICD-10-CM

## 2020-02-20 DIAGNOSIS — R53.82 CHRONIC FATIGUE: ICD-10-CM

## 2020-02-20 DIAGNOSIS — Z87.19 H/O: GI BLEED: ICD-10-CM

## 2020-02-20 DIAGNOSIS — N18.30 CKD (CHRONIC KIDNEY DISEASE) STAGE 3, GFR 30-59 ML/MIN (HCC): ICD-10-CM

## 2020-02-20 DIAGNOSIS — I48.20 CHRONIC ATRIAL FIBRILLATION (CMD): ICD-10-CM

## 2020-02-20 LAB
ANION GAP SERPL CALC-SCNC: 9 MMOL/L (ref 0–18)
BUN BLD-MCNC: 49 MG/DL (ref 7–18)
BUN/CREAT SERPL: 29.2 (ref 10–20)
CALCIUM BLD-MCNC: 9.2 MG/DL (ref 8.5–10.1)
CHLORIDE SERPL-SCNC: 107 MMOL/L (ref 98–112)
CO2 SERPL-SCNC: 24 MMOL/L (ref 21–32)
CREAT BLD-MCNC: 1.68 MG/DL (ref 0.55–1.02)
DEPRECATED RDW RBC AUTO: 69.5 FL (ref 35.1–46.3)
ERYTHROCYTE [DISTWIDTH] IN BLOOD BY AUTOMATED COUNT: 21.6 % (ref 11–15)
GLUCOSE BLD-MCNC: 151 MG/DL (ref 70–99)
HCT VFR BLD AUTO: 35.7 % (ref 35–48)
HGB BLD-MCNC: 10.6 G/DL (ref 12–16)
INR BLD: 2.89 (ref 0.9–1.1)
INR PPP: 2.89 (ref 2–3)
MCH RBC QN AUTO: 26.4 PG (ref 26–34)
MCHC RBC AUTO-ENTMCNC: 29.7 G/DL (ref 31–37)
MCV RBC AUTO: 88.8 FL (ref 80–100)
OSMOLALITY SERPL CALC.SUM OF ELEC: 306 MOSM/KG (ref 275–295)
PATIENT FASTING Y/N/NP: NO
PLATELET # BLD AUTO: 233 10(3)UL (ref 150–450)
POTASSIUM SERPL-SCNC: 4 MMOL/L (ref 3.5–5.1)
PSA SERPL DL<=0.01 NG/ML-MCNC: 32.2 SECONDS (ref 12.5–14.7)
RBC # BLD AUTO: 4.02 X10(6)UL (ref 3.8–5.3)
SODIUM SERPL-SCNC: 140 MMOL/L (ref 136–145)
WBC # BLD AUTO: 9.3 X10(3) UL (ref 4–11)

## 2020-02-20 PROCEDURE — 99215 OFFICE O/P EST HI 40 MIN: CPT | Performed by: NURSE PRACTITIONER

## 2020-02-20 RX ORDER — BUMETANIDE 0.25 MG/ML
4 INJECTION, SOLUTION INTRAMUSCULAR; INTRAVENOUS ONCE
Status: COMPLETED | OUTPATIENT
Start: 2020-02-20 | End: 2020-02-20

## 2020-02-20 RX ADMIN — BUMETANIDE 4 MG: 0.25 INJECTION, SOLUTION INTRAMUSCULAR; INTRAVENOUS at 13:21:00

## 2020-02-20 NOTE — PATIENT INSTRUCTIONS
Heart Failure Discharge Instructions    · Continue current medications. · No torsemide today. · INR=2.89 take 2.5 mg of coumadin  Monday, Wednesday ,and Friday. 5 mg all other days, recheck INR in 2 weeks.      Activity: Regular exercise and activity is i pounds in 3-7 days  · You have more trouble breathing  · You get more tired with regular activity, or are limiting activity because of shortness of breath or fatigue  · You are short of breath lying down, you need more pillows to breathe comfortably,  or w

## 2020-02-20 NOTE — PROGRESS NOTES
Patient assessed. No C/O of Bilateral leg edema but C/O SOB with activity and fatigue. Weight at 135.9 lbs, Up slightly. She looks pale. Reviewed current list of patient's allergies and medication; updated EMR.  BMP, CBC, INR drawn, IV established per raul

## 2020-02-20 NOTE — PROGRESS NOTES
[facility] Cardiac Health Progress Note    Wang Leal is a 80year old female who presents to clinic for APN assessment and management of chronic HFpEF  and is functional class 3.      Subjective:  She returns for routine assessment of CREATSERUM 1.68 02/20/2020    BUN 49 02/20/2020     02/20/2020    K 4.0 02/20/2020     02/20/2020    CO2 24.0 02/20/2020     02/20/2020    CA 9.2 02/20/2020         Current Outpatient Medications:   •  Ferrous Sulfate 325 (65 Fe) MG Oral RV function moderately reduced, mild to moderate TR. Mild fluid overload with worsening SOB for the last couple months, anemia likely also contributing. 3. CKD stage 3 - baseline creatinine 1.2-1.4 mg/dl; stable.   4. Chronic Afib - Hx [employer] PPM with l

## 2020-03-03 ENCOUNTER — DOCUMENTATION (OUTPATIENT)
Dept: CARDIOLOGY | Age: 85
End: 2020-03-03

## 2020-03-09 ENCOUNTER — HOSPITAL ENCOUNTER (OUTPATIENT)
Dept: CARDIOLOGY CLINIC | Facility: HOSPITAL | Age: 85
Discharge: HOME | End: 2020-03-09
Attending: NURSE PRACTITIONER
Payer: MEDICARE

## 2020-03-09 ENCOUNTER — ANTI-COAG (OUTPATIENT)
Dept: CARDIOLOGY | Age: 85
End: 2020-03-09

## 2020-03-09 VITALS
OXYGEN SATURATION: 99 % | DIASTOLIC BLOOD PRESSURE: 39 MMHG | HEART RATE: 70 BPM | RESPIRATION RATE: 21 BRPM | SYSTOLIC BLOOD PRESSURE: 130 MMHG | WEIGHT: 132.38 LBS

## 2020-03-09 DIAGNOSIS — Z95.1 HX OF CABG: ICD-10-CM

## 2020-03-09 DIAGNOSIS — I48.20 CHRONIC ATRIAL FIBRILLATION (CMD): ICD-10-CM

## 2020-03-09 DIAGNOSIS — I27.20 PULMONARY HYPERTENSION (HCC): ICD-10-CM

## 2020-03-09 DIAGNOSIS — Z95.2 HISTORY OF MITRAL VALVE REPLACEMENT: ICD-10-CM

## 2020-03-09 DIAGNOSIS — I50.32 CHRONIC HEART FAILURE WITH NORMAL EJECTION FRACTION (HCC): Primary | ICD-10-CM

## 2020-03-09 DIAGNOSIS — I10 ESSENTIAL HYPERTENSION: ICD-10-CM

## 2020-03-09 DIAGNOSIS — N18.30 CKD (CHRONIC KIDNEY DISEASE) STAGE 3, GFR 30-59 ML/MIN (HCC): ICD-10-CM

## 2020-03-09 DIAGNOSIS — I48.20 CHRONIC ATRIAL FIBRILLATION (HCC): ICD-10-CM

## 2020-03-09 DIAGNOSIS — D50.9 IRON DEFICIENCY ANEMIA, UNSPECIFIED IRON DEFICIENCY ANEMIA TYPE: ICD-10-CM

## 2020-03-09 DIAGNOSIS — Z87.19 H/O: GI BLEED: ICD-10-CM

## 2020-03-09 LAB
ANION GAP SERPL CALC-SCNC: 5 MMOL/L (ref 0–18)
BUN BLD-MCNC: 55 MG/DL (ref 7–18)
BUN/CREAT SERPL: 34.4 (ref 10–20)
CALCIUM BLD-MCNC: 9.6 MG/DL (ref 8.5–10.1)
CHLORIDE SERPL-SCNC: 106 MMOL/L (ref 98–112)
CO2 SERPL-SCNC: 26 MMOL/L (ref 21–32)
CREAT BLD-MCNC: 1.6 MG/DL (ref 0.55–1.02)
DEPRECATED RDW RBC AUTO: 72.8 FL (ref 35.1–46.3)
ERYTHROCYTE [DISTWIDTH] IN BLOOD BY AUTOMATED COUNT: 21.9 % (ref 11–15)
GLUCOSE BLD-MCNC: 132 MG/DL (ref 70–99)
HCT VFR BLD AUTO: 33.7 % (ref 35–48)
HGB BLD-MCNC: 10.4 G/DL (ref 12–16)
INR BLD: 3.45 (ref 0.9–1.1)
INR PPP: 3.45
MCH RBC QN AUTO: 28.1 PG (ref 26–34)
MCHC RBC AUTO-ENTMCNC: 30.9 G/DL (ref 31–37)
MCV RBC AUTO: 91.1 FL (ref 80–100)
OSMOLALITY SERPL CALC.SUM OF ELEC: 301 MOSM/KG (ref 275–295)
PATIENT FASTING Y/N/NP: NO
PLATELET # BLD AUTO: 219 10(3)UL (ref 150–450)
POTASSIUM SERPL-SCNC: 4.8 MMOL/L (ref 3.5–5.1)
PSA SERPL DL<=0.01 NG/ML-MCNC: 37.2 SECONDS (ref 12.5–14.7)
RBC # BLD AUTO: 3.7 X10(6)UL (ref 3.8–5.3)
SODIUM SERPL-SCNC: 137 MMOL/L (ref 136–145)
WBC # BLD AUTO: 8.1 X10(3) UL (ref 4–11)

## 2020-03-09 PROCEDURE — 99214 OFFICE O/P EST MOD 30 MIN: CPT | Performed by: NURSE PRACTITIONER

## 2020-03-09 RX ORDER — CHOLECALCIFEROL (VITAMIN D3) 1250 MCG
1 CAPSULE ORAL WEEKLY
Qty: 12 CAPSULE | Refills: 0 | Status: SHIPPED | OUTPATIENT
Start: 2020-03-09 | End: 2020-07-14

## 2020-03-09 NOTE — PROGRESS NOTES
Pt assessed. Breathing is at baseline with SOB on exertion. Denies abdominal bloating. Mild LE edema. Weight down 3 lbs at 132.4 lbs. Pt's daughter verbalized that they may be looking into getting some help for pt around the house.  Reviewed current list of

## 2020-03-09 NOTE — PROGRESS NOTES
[facility] Cardiac Health Progress Note    Sandra Pretty is a 80year old female who presents to clinic for APN assessment and management of chronic HFpEF  and is functional class 3.      Subjective:  She returns for assessment of volume (62.8 kg)  12/05/19 : 134 lb (60.8 kg)    Lab Results   Component Value Date    WBC 8.1 03/09/2020    HGB 10.4 03/09/2020    HCT 33.7 03/09/2020    .0 03/09/2020    CREATSERUM 1.60 03/09/2020    BUN 55 03/09/2020     03/09/2020    K 4.8 03/09/ APN/clinic. Assessment:   1. Chronic combined HFpEF/RV failure - last LVEF 55-60% on echo - Recent ProBNP is elevated at 4,154 (highest reading to date). Fluid status improved.    2. PH - last PAS 60 mmhg on echo, RV function moderately reduced, mild to

## 2020-03-09 NOTE — PATIENT INSTRUCTIONS
Heart Failure Discharge Instructions    INR 3.4. Hold Coumadin for tonight. Resume regular dose tomorrow: 2.5 mg every MWF, 5 mg all the other days. Recheck INR in 1 week.      Activity: Regular exercise and activity is important for your overall health and trouble breathing  · You get more tired with regular activity, or are limiting activity because of shortness of breath or fatigue  · You are short of breath lying down, you need more pillows to breathe comfortably,  or wake up during the night short of jazmyn

## 2020-03-13 RX ORDER — METOPROLOL SUCCINATE 200 MG/1
TABLET, EXTENDED RELEASE ORAL
Qty: 90 TABLET | Refills: 2 | Status: SHIPPED | OUTPATIENT
Start: 2020-03-13 | End: 2021-01-01

## 2020-03-13 RX ORDER — WARFARIN SODIUM 5 MG/1
5 TABLET ORAL NIGHTLY
Qty: 90 TABLET | Refills: 3 | Status: ON HOLD | OUTPATIENT
Start: 2020-03-13 | End: 2020-05-18

## 2020-03-15 LAB — INR PPP: 2.6

## 2020-03-16 ENCOUNTER — ANTI-COAG (OUTPATIENT)
Dept: CARDIOLOGY | Age: 85
End: 2020-03-16

## 2020-03-16 DIAGNOSIS — I48.20 CHRONIC ATRIAL FIBRILLATION (CMD): ICD-10-CM

## 2020-04-06 ENCOUNTER — HOSPITAL ENCOUNTER (OUTPATIENT)
Dept: CARDIOLOGY CLINIC | Facility: HOSPITAL | Age: 85
Discharge: HOME | End: 2020-04-06
Attending: NURSE PRACTITIONER
Payer: MEDICARE

## 2020-04-06 DIAGNOSIS — I48.20 CHRONIC ATRIAL FIBRILLATION (HCC): ICD-10-CM

## 2020-04-06 DIAGNOSIS — I27.20 PULMONARY HTN (HCC): ICD-10-CM

## 2020-04-06 DIAGNOSIS — I50.810 RIGHT HEART FAILURE, NYHA CLASS 3 (HCC): Primary | ICD-10-CM

## 2020-04-06 PROCEDURE — 99441 PHONE E/M BY PHYS 5-10 MIN: CPT | Performed by: NURSE PRACTITIONER

## 2020-04-06 NOTE — PROGRESS NOTES
Virtual/Telephone Check-In    Tray Rasmussen verbally consents to a Virtual/Telephone Check-In service on 04/06/20. Patient understands and accepts financial responsibility for any deductible, co-insurance and/or co-pays associated with this service.

## 2020-04-10 ENCOUNTER — ANTI-COAG (OUTPATIENT)
Dept: CARDIOLOGY | Age: 85
End: 2020-04-10

## 2020-04-10 ENCOUNTER — HOSPITAL ENCOUNTER (OUTPATIENT)
Dept: LAB | Facility: HOSPITAL | Age: 85
Discharge: HOME | End: 2020-04-10
Attending: INTERNAL MEDICINE
Payer: MEDICARE

## 2020-04-10 DIAGNOSIS — D50.9 IRON DEFICIENCY ANEMIA, UNSPECIFIED IRON DEFICIENCY ANEMIA TYPE: ICD-10-CM

## 2020-04-10 DIAGNOSIS — I50.9 CHF (CONGESTIVE HEART FAILURE) (HCC): Primary | ICD-10-CM

## 2020-04-10 DIAGNOSIS — I48.20 CHRONIC ATRIAL FIBRILLATION (CMD): ICD-10-CM

## 2020-04-10 DIAGNOSIS — I50.31 ACUTE DIASTOLIC HEART FAILURE (HCC): ICD-10-CM

## 2020-04-10 LAB — INR PPP: 3.2

## 2020-04-10 PROCEDURE — 85610 PROTHROMBIN TIME: CPT | Performed by: INTERNAL MEDICINE

## 2020-04-13 ENCOUNTER — ANCILLARY ORDERS (OUTPATIENT)
Dept: CARDIOLOGY | Age: 85
End: 2020-04-13

## 2020-04-13 ENCOUNTER — ANCILLARY PROCEDURE (OUTPATIENT)
Dept: CARDIOLOGY | Age: 85
End: 2020-04-13
Attending: INTERNAL MEDICINE

## 2020-04-13 DIAGNOSIS — Z95.0 CARDIAC PACEMAKER: ICD-10-CM

## 2020-04-13 PROCEDURE — 93294 REM INTERROG EVL PM/LDLS PM: CPT | Performed by: INTERNAL MEDICINE

## 2020-04-13 PROCEDURE — X1114 CARDIAC DEVICE HOME CHECK - REMOTE UNSCHEDULED: HCPCS | Performed by: INTERNAL MEDICINE

## 2020-05-05 ENCOUNTER — HOSPITAL ENCOUNTER (OUTPATIENT)
Dept: CARDIOLOGY CLINIC | Facility: HOSPITAL | Age: 85
Discharge: HOME | End: 2020-05-05
Attending: NURSE PRACTITIONER
Payer: MEDICARE

## 2020-05-05 ENCOUNTER — ANTI-COAG (OUTPATIENT)
Dept: CARDIOLOGY | Age: 85
End: 2020-05-05

## 2020-05-05 ENCOUNTER — HOSPITAL ENCOUNTER (OUTPATIENT)
Dept: LAB | Facility: HOSPITAL | Age: 85
Discharge: HOME | End: 2020-05-05
Attending: INTERNAL MEDICINE
Payer: MEDICARE

## 2020-05-05 DIAGNOSIS — I50.812 CHRONIC RIGHT-SIDED HEART FAILURE (HCC): Primary | ICD-10-CM

## 2020-05-05 DIAGNOSIS — D50.9 IRON DEFICIENCY ANEMIA, UNSPECIFIED IRON DEFICIENCY ANEMIA TYPE: ICD-10-CM

## 2020-05-05 DIAGNOSIS — Z79.01 LONG TERM (CURRENT) USE OF ANTICOAGULANTS: ICD-10-CM

## 2020-05-05 DIAGNOSIS — I27.20 PULMONARY HTN (HCC): ICD-10-CM

## 2020-05-05 DIAGNOSIS — I48.20 CHRONIC ATRIAL FIBRILLATION (HCC): ICD-10-CM

## 2020-05-05 DIAGNOSIS — I48.20 CHRONIC ATRIAL FIBRILLATION (CMD): ICD-10-CM

## 2020-05-05 LAB — INR PPP: 2.6

## 2020-05-05 PROCEDURE — 99443 PHONE E/M BY PHYS 21-30 MIN: CPT | Performed by: NURSE PRACTITIONER

## 2020-05-05 PROCEDURE — 85610 PROTHROMBIN TIME: CPT

## 2020-05-05 NOTE — PROGRESS NOTES
Virtual Telephone Check-In    Kenya Laurel verbally consents to a Virtual/Telephone Check-In visit on 05/05/20. Patient understands and accepts financial responsibility for any deductible, co-insurance and/or co-pays associated with this service. week (BMP, ProBNP, CBC). · Continue current medications. · Virtual visit in one month, earlier if needed.          Gianfranco Kaiser NP   5/5/2020  3:46 PM

## 2020-05-11 ENCOUNTER — LAB REQUISITION (OUTPATIENT)
Dept: LAB | Facility: HOSPITAL | Age: 85
End: 2020-05-11
Payer: MEDICARE

## 2020-05-11 ENCOUNTER — TELEPHONE (OUTPATIENT)
Dept: CARDIOLOGY CLINIC | Facility: HOSPITAL | Age: 85
End: 2020-05-11

## 2020-05-11 DIAGNOSIS — I50.9 HEART FAILURE, UNSPECIFIED (HCC): ICD-10-CM

## 2020-05-11 PROCEDURE — 83880 ASSAY OF NATRIURETIC PEPTIDE: CPT | Performed by: INTERNAL MEDICINE

## 2020-05-11 PROCEDURE — 85025 COMPLETE CBC W/AUTO DIFF WBC: CPT | Performed by: INTERNAL MEDICINE

## 2020-05-11 PROCEDURE — 80048 BASIC METABOLIC PNL TOTAL CA: CPT | Performed by: INTERNAL MEDICINE

## 2020-05-11 NOTE — TELEPHONE ENCOUNTER
I received a message from Madonna Silva (daughter) of Gretchen Abraham. She was wanting to make sure her mom was going to get her blood drawn to check for anemia. I spoke with Wellington Tirado at [facility] who confirmed that she was going to get blood work this week.   I

## 2020-05-12 ENCOUNTER — TELEPHONE (OUTPATIENT)
Dept: CARDIOLOGY CLINIC | Facility: HOSPITAL | Age: 85
End: 2020-05-12

## 2020-05-12 NOTE — TELEPHONE ENCOUNTER
Called pt. And spoke with pt's daughter re: recent lab results. Informed Herman Ramires that her Hgb dropped from 10.4 to 7.9. Sherry SPEARS stated this may be one indicator as to why the pt. Is short f breath. Lamar recommends the pt. Reach out to her PCP.  Rene Garsia

## 2020-05-14 ENCOUNTER — TELEPHONE (OUTPATIENT)
Dept: CARDIOLOGY | Age: 85
End: 2020-05-14

## 2020-05-15 ENCOUNTER — APPOINTMENT (OUTPATIENT)
Dept: GENERAL RADIOLOGY | Facility: HOSPITAL | Age: 85
End: 2020-05-15
Attending: EMERGENCY MEDICINE
Payer: MEDICARE

## 2020-05-15 ENCOUNTER — HOSPITAL ENCOUNTER (OUTPATIENT)
Dept: CARDIOLOGY CLINIC | Facility: HOSPITAL | Age: 85
Discharge: ANOTHER HOSPITAL | End: 2020-05-15
Attending: NURSE PRACTITIONER
Payer: MEDICARE

## 2020-05-15 ENCOUNTER — HOSPITAL ENCOUNTER (OUTPATIENT)
Facility: HOSPITAL | Age: 85
Setting detail: OBSERVATION
Discharge: HOME | End: 2020-05-18
Attending: EMERGENCY MEDICINE | Admitting: INTERNAL MEDICINE
Payer: MEDICARE

## 2020-05-15 VITALS
WEIGHT: 135.69 LBS | OXYGEN SATURATION: 79 % | DIASTOLIC BLOOD PRESSURE: 53 MMHG | SYSTOLIC BLOOD PRESSURE: 91 MMHG | RESPIRATION RATE: 18 BRPM | HEART RATE: 75 BPM

## 2020-05-15 DIAGNOSIS — J90 CHRONIC BILATERAL PLEURAL EFFUSIONS: ICD-10-CM

## 2020-05-15 DIAGNOSIS — I10 ESSENTIAL HYPERTENSION: ICD-10-CM

## 2020-05-15 DIAGNOSIS — Z95.2 HISTORY OF MITRAL VALVE REPLACEMENT: ICD-10-CM

## 2020-05-15 DIAGNOSIS — I50.9 ACUTE ON CHRONIC HEART FAILURE, UNSPECIFIED HEART FAILURE TYPE (HCC): ICD-10-CM

## 2020-05-15 DIAGNOSIS — R06.00 DYSPNEA ON EXERTION: Primary | ICD-10-CM

## 2020-05-15 DIAGNOSIS — D50.9 IRON DEFICIENCY ANEMIA, UNSPECIFIED IRON DEFICIENCY ANEMIA TYPE: ICD-10-CM

## 2020-05-15 DIAGNOSIS — D64.9 ANEMIA, UNSPECIFIED TYPE: ICD-10-CM

## 2020-05-15 DIAGNOSIS — I50.812 CHRONIC RIGHT-SIDED HEART FAILURE (HCC): Primary | ICD-10-CM

## 2020-05-15 DIAGNOSIS — I27.20 PULMONARY HTN (HCC): ICD-10-CM

## 2020-05-15 DIAGNOSIS — N18.30 CKD (CHRONIC KIDNEY DISEASE) STAGE 3, GFR 30-59 ML/MIN (HCC): ICD-10-CM

## 2020-05-15 DIAGNOSIS — D64.9 ANEMIA: ICD-10-CM

## 2020-05-15 DIAGNOSIS — I48.20 CHRONIC ATRIAL FIBRILLATION (HCC): ICD-10-CM

## 2020-05-15 DIAGNOSIS — Z95.1 HX OF CABG: ICD-10-CM

## 2020-05-15 PROBLEM — R06.09 DYSPNEA ON EXERTION: Status: ACTIVE | Noted: 2020-05-15

## 2020-05-15 PROCEDURE — 71045 X-RAY EXAM CHEST 1 VIEW: CPT | Performed by: EMERGENCY MEDICINE

## 2020-05-15 PROCEDURE — 99215 OFFICE O/P EST HI 40 MIN: CPT | Performed by: NURSE PRACTITIONER

## 2020-05-15 PROCEDURE — 99214 OFFICE O/P EST MOD 30 MIN: CPT | Performed by: INTERNAL MEDICINE

## 2020-05-15 PROCEDURE — 99220 INITIAL OBSERVATION CARE,LEVL III: CPT | Performed by: INTERNAL MEDICINE

## 2020-05-15 PROCEDURE — 30233N1 TRANSFUSION OF NONAUTOLOGOUS RED BLOOD CELLS INTO PERIPHERAL VEIN, PERCUTANEOUS APPROACH: ICD-10-PCS | Performed by: EMERGENCY MEDICINE

## 2020-05-15 RX ORDER — WARFARIN SODIUM 5 MG/1
5 TABLET ORAL
Status: DISCONTINUED | OUTPATIENT
Start: 2020-05-16 | End: 2020-05-16

## 2020-05-15 RX ORDER — METOCLOPRAMIDE HYDROCHLORIDE 5 MG/ML
5 INJECTION INTRAMUSCULAR; INTRAVENOUS EVERY 8 HOURS PRN
Status: DISCONTINUED | OUTPATIENT
Start: 2020-05-15 | End: 2020-05-18

## 2020-05-15 RX ORDER — AMLODIPINE BESYLATE 5 MG/1
5 TABLET ORAL DAILY
Status: DISCONTINUED | OUTPATIENT
Start: 2020-05-15 | End: 2020-05-18

## 2020-05-15 RX ORDER — METOPROLOL SUCCINATE 100 MG/1
200 TABLET, EXTENDED RELEASE ORAL NIGHTLY
Status: DISCONTINUED | OUTPATIENT
Start: 2020-05-15 | End: 2020-05-18

## 2020-05-15 RX ORDER — FUROSEMIDE 10 MG/ML
40 INJECTION INTRAMUSCULAR; INTRAVENOUS
Status: DISCONTINUED | OUTPATIENT
Start: 2020-05-15 | End: 2020-05-18

## 2020-05-15 RX ORDER — MELATONIN
325
Status: DISCONTINUED | OUTPATIENT
Start: 2020-05-16 | End: 2020-05-18

## 2020-05-15 RX ORDER — WARFARIN SODIUM 2.5 MG/1
2.5 TABLET ORAL
Status: DISCONTINUED | OUTPATIENT
Start: 2020-05-15 | End: 2020-05-16

## 2020-05-15 RX ORDER — LOSARTAN POTASSIUM 100 MG/1
100 TABLET ORAL NIGHTLY
Status: DISCONTINUED | OUTPATIENT
Start: 2020-05-15 | End: 2020-05-18

## 2020-05-15 RX ORDER — ACETAMINOPHEN 325 MG/1
650 TABLET ORAL EVERY 6 HOURS PRN
Status: DISCONTINUED | OUTPATIENT
Start: 2020-05-15 | End: 2020-05-18

## 2020-05-15 RX ORDER — FUROSEMIDE 10 MG/ML
60 INJECTION INTRAMUSCULAR; INTRAVENOUS ONCE
Status: COMPLETED | OUTPATIENT
Start: 2020-05-15 | End: 2020-05-15

## 2020-05-15 RX ORDER — ONDANSETRON 2 MG/ML
4 INJECTION INTRAMUSCULAR; INTRAVENOUS EVERY 6 HOURS PRN
Status: DISCONTINUED | OUTPATIENT
Start: 2020-05-15 | End: 2020-05-18

## 2020-05-15 NOTE — ED PROVIDER NOTES
Patient Seen in: [facility] Emergency Department      History   Patient presents with:  Abnormal Result    Stated Complaint: low hgb , sob    HPI    Patient has a history of chronic heart failure class IV.   Patient is known to have coronary artery d Review of Systems    Positive for stated complaint: low hgb , sob  Other systems are as noted in HPI. Constitutional and vital signs reviewed. All other systems reviewed and negative except as noted above.     Physical Exam     ED Triage Vitals PLATELET    Narrative: The following orders were created for panel order CBC WITH DIFFERENTIAL WITH PLATELET.   Procedure                               Abnormality         Status                     ---------                               ----------- effusions    Disposition:  Admit  5/15/2020 11:51 am    Follow-up:  No follow-up provider specified.         Medications Prescribed:  Current Discharge Medication List                       Present on Admission  Date Reviewed: 3/9/2020    None           A t

## 2020-05-15 NOTE — PROGRESS NOTES
[facility] Cardiac Health Progress Note    Mau Perdomo is a 80year old female who presents to clinic for APN assessment and management of chronic HFpEF  and is functional class 4.      Subjective:  She comes in today for blood transf History    Tobacco Use      Smoking status: Never Smoker      Smokeless tobacco: Never Used    Alcohol use: No    Drug use: No          Objective:    Cardiac Rhythm: Atrial fibrillation    BP 91/53   Pulse 75   Resp 18   Wt 135 lb 11.2 oz (61.6 kg)   SpO2 RBC Latest Ref Range: 3.80 - 5.30 x10(6)uL 3.00 (L)   MCH Latest Ref Range: 26.0 - 34.0 pg 26.3   MCHC Latest Ref Range: 31.0 - 37.0 g/dL 30.6 (L)   MCV Latest Ref Range: 80.0 - 100.0 fL 86.0   RDW Latest Ref Range: 11.0 - 15.0 % 17.4 (H)   RDW-SD Latest arrival today in the clinic with sats in the 80's. Plan:     · Send to the ED with acute respiratory distress secondary to anemia and decompensated HF. · Transfuse 1 unit PRBC's  · Diurese after transfusion.    · Hold coumadin, discuss long to risk vs.

## 2020-05-15 NOTE — PROGRESS NOTES
Received patient from ER in stable condition  Blood transfusion in progress- patient tolerating well. Iv lasix to follow. 4l nc weaned to 2l nc.  Will continue to wean as able  Daughter called on patient's request to update on plan of care  DNR- working w

## 2020-05-15 NOTE — CONSULTS
[facility] Pharmacy Note:  Renal Dose Adjustment for Metoclopramide (REGLAN)    Mingo Hurtado has been prescribed Metoclopramide (REGLAN) 10 mg every 8 hours as needed for n/v.    Estimated Creatinine Clearance: 16 mL/min (A) (based on SCr of 1.59 mg/dL (H)).

## 2020-05-15 NOTE — PROGRESS NOTES
Pt. Assessed. Weight up 3 1/2 pounds at 135.7lbsPt. c/o shortness of breath; O2 sat on RA 85%. Applied 2l/nc with O2 sats 95-98%. Respirations 26. BP stable. Obtained consent for blood transfusion.  Labs drawn, including type and screen, INR, BMP and IV line

## 2020-05-15 NOTE — H&P
[facility]  History and Physical     Negroanthony Arroyo Patient Status:  Emergency    10/30/1922 MRN [id number]   Location [address] [facility] Attending Brenda Hernandes MD   Hosp Day # 0 PCP JAYCE LEACH [facility] 2  torsemide 20 MG Oral Tab, Take Torsemide 40mg by mouth every morning, add Torsemide 20mg by mouth every afternoon. , Disp: 90 tablet, Rfl: 3  spironolactone 25 MG Oral Tab, Take 0.5 tablets (12.5 mg total) by mouth daily.  Take 30 mins before your Torsemi CREATSERUM 1.51* 1.59*   GFRAA 33* 31*   GFRNAA 29* 27*   CA 9.6 9.4    140   K 4.2 3.9    108   CO2 22.0 25.0       Estimated Creatinine Clearance: 16 mL/min (A) (based on SCr of 1.59 mg/dL (H)). Recent Labs   Lab 05/15/20  0856   PTP 23.

## 2020-05-15 NOTE — CONSULTS
[facility] at [address] [facility]  Report of Consultation    Rossanamyra Washburn Patient Status:  Observation    10/30/1922 MRN [id number]   [facility] 8NE-A Attending Rosalinda Muse MD   Hosp Day # 0 PCP [facility] 5 mg, Oral, Once per day on Sun Tue Thu Sat  •  acetaminophen (TYLENOL) tab 650 mg, 650 mg, Oral, Q6H PRN  •  ondansetron HCl (ZOFRAN) injection 4 mg, 4 mg, Intravenous, Q6H PRN  •  Metoclopramide HCl (REGLAN) injection 5 mg, 5 mg, Intravenous, Q8H PRN  • heart failure, unspecified heart failure type ([facility] [address]     Chronic bilateral pleural effusions     Dyspnea      Assessment:1.  Anemia, probable slow GI blood loss with superimposed anticoagulation                      2. History of CHF, exacerbated by anemia

## 2020-05-16 PROCEDURE — 99214 OFFICE O/P EST MOD 30 MIN: CPT | Performed by: NURSE PRACTITIONER

## 2020-05-16 PROCEDURE — 99225 SUBSEQUENT OBSERVATION CARE: CPT | Performed by: INTERNAL MEDICINE

## 2020-05-16 RX ORDER — POTASSIUM CHLORIDE 20 MEQ/1
40 TABLET, EXTENDED RELEASE ORAL ONCE
Status: COMPLETED | OUTPATIENT
Start: 2020-05-16 | End: 2020-05-16

## 2020-05-16 NOTE — CM/SW NOTE
Per RN, pt will need 02 at d/c. Will need Home 02 order, SATS, and progress note from the hospitalist to refer to an [facility]. Pt will be ready for d/c on Sunday.

## 2020-05-16 NOTE — PROGRESS NOTES
Attempted to wean patient from oxygen. On room air patient de-sats to 84%. When O2 2L per NC applied O2 sats improved to 92%. Will notify MD and social work to facilitate home o2 orders.

## 2020-05-16 NOTE — PLAN OF CARE
Patient alert and co operating well , denies any cp / chest discomfort, controlled HR,blood count  Number came up , she had  One unit of transfusion yesterday , no acute distress noted, will continue to monitor closely    Problem: [address] oxygenation  Description  INTERVENTIONS:  - Assess for changes in respiratory status  - Assess for changes in mentation and behavior  - Position to facilitate oxygenation and minimize respiratory effort  - Oxygen supplementation based on oxygen saturation Initiate interventions, skin care algorithm/standards of care as needed  Outcome: Progressing     Problem: HEMATOLOGIC - ADULT  Goal: Maintains hematologic stability  Description  INTERVENTIONS  - Assess for signs and symptoms of bleeding or hemorrhage  - needed  - Assess and instruct to report SOB or any respiratory difficulty  - Respiratory Therapy support as indicated  - Manage/alleviate anxiety  - Monitor for signs/symptoms of CO2 retention  Outcome: Progressing     Problem: HEMATOLOGIC - ADULT  Goal: M

## 2020-05-16 NOTE — PLAN OF CARE
Assumed care of patient at 0700. Alert Ox3- [place]- bilat aides in. Glasses on. Patient feels breathing is better but still dyspneic on exertion. Slight crackles noted to L lung base- otherwise clear. Attempted to wean off o2 twice.   Patient desats at res Progressing  Goal: Absence of cardiac arrhythmias or at baseline  Description  INTERVENTIONS:  - Continuous cardiac monitoring, monitor vital signs, obtain 12 lead EKG if indicated  - Evaluate effectiveness of antiarrhythmic and heart rate control medicati hemorrhage  - Monitor labs and vital signs for trends  - Administer supportive blood products/factors, fluids and medications as ordered and appropriate  - Administer supportive blood products/factors as ordered and appropriate  Outcome: Progressing  Goal:

## 2020-05-16 NOTE — PROGRESS NOTES
[facility]  Progress Note     Sol Kera Patient Status:  Observation    10/30/1922 MRN [id number]   [facility] 8NE-A Attending Martell Molina MD   Hosp Day # 0 PCP JAYCE Patton     Chief Complaint: SOB    S: [place] Sujey TROP <0.045            Imaging: Imaging data reviewed in Epic.     Medications:   • amLODIPine Besylate  5 mg Oral Daily   • ferrous sulfate  325 mg Oral Daily with breakfast   • losartan  100 mg Oral Nightly   • Metoprolol Succinate ER  200 mg Oral Night

## 2020-05-16 NOTE — PROGRESS NOTES
· [facility]      Subjective:  Up in chair, dyspnea improved - on 2 liters 02 not on at home    Objective:  AFib rare v pacing  129/50  Afebrile  I/O incomplete    CXR moderate bilateral pleural effusions  BUN/cr 47/1.41  Hgb 8.9  INR 2.07

## 2020-05-17 PROCEDURE — 99215 OFFICE O/P EST HI 40 MIN: CPT | Performed by: INTERNAL MEDICINE

## 2020-05-17 PROCEDURE — 99225 SUBSEQUENT OBSERVATION CARE: CPT | Performed by: HOSPITALIST

## 2020-05-17 NOTE — PHYSICAL THERAPY NOTE
PHYSICAL THERAPY EVALUATION - INPATIENT     Room Number: 0655/8511-S  Evaluation Date: 5/17/2020  Type of Evaluation: Initial  Physician Order: PT Eval and Treat    Presenting Problem: CHF, anemia, A-fib  Reason for Therapy: Mobility Dysfunction and PAIN ASSESSMENT  Ratin          COGNITION  · Overall Cognitive Status:  WFL - within functional limits    RANGE OF MOTION AND STRENGTH ASSESSMENT  Upper extremity ROM and strength are within functional limits     Lower extremity ROM is within funct therapist in agreement. Pt stating \"I know when I will need to go to the nursing home. \" She demonstrates insight into her limitations. Updated pt on role of PT, POC, DC planning/recs, positioning.,activity. PPE donned for session: gloves,surgical mask. modified independent     Goal #2 Patient is able to demonstrate transfers Sit to/from Stand at assistance level: modified independent     Goal #3 Patient is able to ambulate 150 feet with assist device: walker - rolling at assistance level: modified indepaugusto

## 2020-05-17 NOTE — PROGRESS NOTES
05/17/20 1412   Clinical Encounter Type   Visited With Patient; Health care provider  (EZEQUIEL Thompson)   Routine Visit   (Responded to request for consult - POLST)   Patient's Supportive Strategies/Resources 1:1 interaction with patient's RN   Stefan Samayoa

## 2020-05-17 NOTE — OCCUPATIONAL THERAPY NOTE
OCCUPATIONAL THERAPY EVALUATION - INPATIENT     Room Number: 2504/5479-S  Evaluation Date: 5/17/2020  Type of Evaluation: Initial  Presenting Problem: RIVAS, Acute on chronic heart failure, Anemia, chronic bilateral pleural effusions    Physician Order: COLT MARK with IADLs prn. Patient still drives but has not driven in the past month. SUBJECTIVE   \"I know how to pace myself. \"    Patient self-stated goal is to go home     OBJECTIVE  Precautions: Sternal;Cardiac  Fall Risk: High fall risk    WEIGHT BEARING RE volume. Supine to EOB with supervision. EOB sitting with supervision  EOB to stand with supervision. Required CGA while walking to/from bathroom w/ use of walker w/ need for cues to slow down and pace self.   Patient instructed in pacing, deep breathin modifications of tasks    Clinical Decision Making LOW - Analysis of occupational profile, problem-focused assessments, limited treatment options    Overall Complexity LOW     OT Discharge Recommendations: Intermittent Supervision;Home with home health PT/

## 2020-05-17 NOTE — PROGRESS NOTES
[facility]  Progress Note    Hanna Benham Patient Status:  Observation    10/30/1922 MRN [id number]   [facility] 8NE-A Attending Carolyn MD Alana   Hosp Day # 0 PCP JAYCE MCKEON       Assessment and Plan:  Patient Active an outpatient and she will be seeing her tomorrow to make a definitive decision. Subjective:  No chest pain or shortness of breath.     Objective:  /46 (BP Location: Right arm)   Pulse 80   Temp 97.5 °F (36.4 °C) (Oral)   Resp 16   Ht 5' 2\" (1.5 tab 100 mg, 100 mg, Oral, Nightly  Metoprolol Succinate ER (Toprol XL) 24 hr tab 200 mg, 200 mg, Oral, Nightly  acetaminophen (TYLENOL) tab 650 mg, 650 mg, Oral, Q6H PRN  ondansetron HCl (ZOFRAN) injection 4 mg, 4 mg, Intravenous, Q6H PRN  Metoclopramide H

## 2020-05-17 NOTE — PROGRESS NOTES
[facility]  Progress Note     Luisito Barrios Patient Status:  Observation    10/30/1922 MRN [id number]   [facility] 8NE-A Attending Sagar Rivas MD   Hosp Day # 0 PCP JAYCE Apple     Chief Complaint: SOB    S: Dianelys Chimera 24.3*   INR 2.04* 2.07* 2.12*       Recent Labs   Lab 05/15/20  0856   TROP <0.045            Imaging: Imaging data reviewed in Epic.     Medications:   • amLODIPine Besylate  5 mg Oral Daily   • ferrous sulfate  325 mg Oral Daily with breakfast   • losarta

## 2020-05-17 NOTE — PLAN OF CARE
Rcv'd A/O/3  DNR POLST to be signed  Denies pain or discomfort  On tele with A-fib occasional V-paced BBB  Coumadin on hold   Lung sounds clear bilateral  noted  Lasix BID   Fall risk  bed alarm    Problem: CARDIOVASCULAR - ADULT  Goal: Maintains optima retention  Outcome: Progressing

## 2020-05-17 NOTE — CM/SW NOTE
If patient requires home o2 setup at discharge, rn to chart the following:  O2 Evaluation:  SPO2 % ON ROOM AIR ___  SPO2% AMBULATION ON ROOM AIR ___  SPO2% AMBULATION ON O2 ___ LITERS PER MINUTE  ___ %.     Will also need MD orders:  Home O2 concentrator an

## 2020-05-18 VITALS
SYSTOLIC BLOOD PRESSURE: 120 MMHG | HEIGHT: 62 IN | OXYGEN SATURATION: 97 % | WEIGHT: 129.88 LBS | RESPIRATION RATE: 16 BRPM | HEART RATE: 76 BPM | DIASTOLIC BLOOD PRESSURE: 42 MMHG | TEMPERATURE: 98 F | BODY MASS INDEX: 23.9 KG/M2

## 2020-05-18 PROCEDURE — 99217 OBSERVATION CARE DISCHARGE: CPT | Performed by: HOSPITALIST

## 2020-05-18 PROCEDURE — 99215 OFFICE O/P EST HI 40 MIN: CPT | Performed by: INTERNAL MEDICINE

## 2020-05-18 NOTE — PROGRESS NOTES
Assumed care of patient at 0730. Patient alert and oriented. Vital signs stable. Atrial fibrillation and v paced on telemetry. O2 weaned to room air today. O2 walk without O2. Saturations maintained %. IV diuretics administered as ordered.   Pl

## 2020-05-18 NOTE — PROGRESS NOTES
[facility]  Cardiology Progress Note    Gabriela Topete Patient Status:  Observation    10/30/1922 MRN [id number]   [facility] 8NE-A Attending Marshal Shetty MD   Hosp Day # 0 PCP Pantera MCKEON       Subjective:   In chair on with breakfast   • losartan  100 mg Oral Nightly   • Metoprolol Succinate ER  200 mg Oral Nightly   • furosemide  40 mg Intravenous BID (Diuretic)         Assessment:  · Anemia with Hgb 9.6 s/p transfusion.  Has been getting scheduled transfusions with Glenis Adams [address] [facility] [address]  [address]  Phone: [phone]  Fax: [phone]  E-mail: [email or web address]    5/18/2020  1:08 PM

## 2020-05-18 NOTE — PHYSICAL THERAPY NOTE
PHYSICAL THERAPY TREATMENT NOTE - INPATIENT    Room Number: 5854/0128-E     Session: 1   Number of Visits to Meet Established Goals: 3    Presenting Problem: CHF, anemia, A-fib    History related to current admission: Pt admitted with dyspnea on exertion, -   Sitting down on and standing up from a chair with arms (e.g., wheelchair, bedside commode, etc.): None   -   Moving from lying on back to sitting on the side of the bed?: None   How much help from another person does the patient currently need. ..    - education; Family education;Gait training;Stair training;Transfer training  Rehab Potential : Fair  Frequency (Obs): 3-5x/week    CURRENT GOALS   Goal #1 Patient is able to demonstrate supine - sit EOB @ level: modified independent      Goal #2 Patient is a

## 2020-05-18 NOTE — PLAN OF CARE
Problem: Patient/Family Goals  Goal: Patient/Family Long Term Goal  Description  Patient's Long Term Goal: discharge     Interventions:  - blood transfusion, code status, v/s and labs better, follow up appoinment  - See additional Care Plan goals for spe

## 2020-05-18 NOTE — HOME CARE LIAISON
Received referral from [facility] [place] Nayana with patient at the bedside to discuss home health services and offer choice. Patient is agreeable to [facility] services at discharge. Brochure and contact information provided. Any questions addressed. Will follow.

## 2020-05-18 NOTE — CM/SW NOTE
Patient is anticipated to dc home today. She will not require home 02 per RN. MSW referred the patient to [facility]  P:[phone]  F:[phone].     Bijan Jensen LCSW

## 2020-05-18 NOTE — PROGRESS NOTES
05/18/20 1146   Mobility   O2 walk?  Yes   SPO2 on Room Air at Rest 95   SPO2 Ambulation on Room Air 92

## 2020-05-18 NOTE — PLAN OF CARE
RESPIRATORY     04/07/17 1417   Events/Summary/Plan   Events/Summary/Plan APN placed orders for PEP. Pt exceeding predicted volume on IS will hold start.   Interdisciplinary Plan of Care-Outcomes    Hyperinflation Protocol Goals/Outcome Greater Than 60% of Predicted I.S. Volume x 24 hrs   Education   Education Yes - Pt. / Family has been Instructed in use of Respiratory Equipment   Incentive Spirometry Group   Breathing Exercises Yes   Incentive Spirometer Volume 3250 mL   Respiratory WDL   Respiratory (WDL) X   Chest Exam   Respiration (!) 10   Pulse 86   Breath Sounds   LLL Breath Sounds Diminished   Oxygen   Pulse Oximetry 99 %   O2 (LPM) 1   O2 Daily Delivery Respiratory  Silicone Nasal Cannula      Rcv'd A/O/3  DNR   Denies pain or discomfort  Requesting tylenol for sleep   On tele with A-fib/V pacing  Lung sounds clear bilateral   noted  Fal risk.  Bed alarm noted     0230  Continues to drop below 92% RA   Mouth breather  Placed on 2 L O2 nc   Con indicated  - Manage/alleviate anxiety  - Monitor for signs/symptoms of CO2 retention  Outcome: Progressing

## 2020-05-18 NOTE — PROGRESS NOTES
NURSING DISCHARGE NOTE  Tele taken off, saline lock  Discontinued,DC instructions given to patient , verbalizes understanding. Daughter aware, waiting in the [place] entrance . Discharged Home via Wheelchair.   Accompanied by Family member  Belongings Ta

## 2020-05-18 NOTE — CARDIAC REHAB
Order received for cardiac rehab heart failure education. Attempted to see patient, however patient was asleep. Will revisit as time allows.

## 2020-05-19 ENCOUNTER — ANTI-COAG (OUTPATIENT)
Dept: CARDIOLOGY | Age: 85
End: 2020-05-19

## 2020-05-19 DIAGNOSIS — I48.20 CHRONIC ATRIAL FIBRILLATION (CMD): ICD-10-CM

## 2020-05-19 NOTE — DISCHARGE SUMMARY
[facility]  DISCHARGE SUMMARY     Ryder Cuenca Patient Status:  Observation    10/30/1922 MRN [id number]   [facility] 8NE-A Attending No att. providers found   Hosp Day # 0 PCP JAYCE Jamison     Date of Admission: 5/15/ decision made to DC coumadin in order to prolong time between prbc infusions. Sx's much improved and pt able to DC home.     Procedures during hospitalization:   • none    Incidental or significant findings and recommendations (brief descriptions):  • none MD  [address] [facility]  [address] [facility] [address] [phone]    Schedule an appointment as soon as possible for a visit in 3 weeks        Vital signs:  Temp:  [97.2 °F (36.2 °C)-97.7 °F (36.5 °C)] 97.5 °F (36.4 °C)  Puls

## 2020-05-26 ENCOUNTER — TELEPHONE (OUTPATIENT)
Dept: CARDIOLOGY | Age: 85
End: 2020-05-26

## 2020-05-28 ENCOUNTER — LAB REQUISITION (OUTPATIENT)
Dept: LAB | Facility: HOSPITAL | Age: 85
End: 2020-05-28
Payer: MEDICARE

## 2020-05-28 ENCOUNTER — HOSPITAL ENCOUNTER (OUTPATIENT)
Dept: CARDIOLOGY CLINIC | Facility: HOSPITAL | Age: 85
Discharge: HOME | End: 2020-05-28
Attending: NURSE PRACTITIONER
Payer: MEDICARE

## 2020-05-28 DIAGNOSIS — I13.0 HYPERTENSIVE HEART AND CHRONIC KIDNEY DISEASE WITH HEART FAILURE AND STAGE 1 THROUGH STAGE 4 CHRONIC KIDNEY DISEASE, OR UNSPECIFIED CHRONIC KIDNEY DISEASE (HCC): ICD-10-CM

## 2020-05-28 DIAGNOSIS — I50.43 ACUTE ON CHRONIC COMBINED SYSTOLIC (CONGESTIVE) AND DIASTOLIC (CONGESTIVE) HEART FAILURE (HCC): ICD-10-CM

## 2020-05-28 PROCEDURE — 85025 COMPLETE CBC W/AUTO DIFF WBC: CPT

## 2020-05-28 PROCEDURE — 80048 BASIC METABOLIC PNL TOTAL CA: CPT

## 2020-05-28 PROCEDURE — 99214 OFFICE O/P EST MOD 30 MIN: CPT | Performed by: NURSE PRACTITIONER

## 2020-05-28 PROCEDURE — 82306 VITAMIN D 25 HYDROXY: CPT

## 2020-05-28 NOTE — PROGRESS NOTES
Virtual Telephone Check-In    Usha Morales verbally consents to a Virtual/Telephone Check-In visit on 05/28/20. Patient understands and accepts financial responsibility for any deductible, co-insurance and/or co-pays associated with this service. HPI.     HISTORY:  Past Medical History:   Diagnosis Date   • Anemia    • Arrhythmia    • Atherosclerosis of coronary artery    • Congestive heart disease ([facility] [address]    • Essential hypertension    • Heart murmur    • Hyperlipidemia       Past Surgical History: next week. · Continue current medications. · F/U with Dr. Balwinder Perera 8/12. · CHF discharge instructions given        Please note that the following visit was completed using two-way, real-time interactive audio and/or video communication.   This has bee

## 2020-05-29 ENCOUNTER — TELEPHONE (OUTPATIENT)
Dept: CARDIOLOGY CLINIC | Facility: HOSPITAL | Age: 85
End: 2020-05-29

## 2020-05-29 NOTE — TELEPHONE ENCOUNTER
Per Mira Fuelling APN, Spoke with Keny Dennis and informed her that Dena's Hemoglobin is stable after recent hospital stay with transfusion and her Kidney function is at baseline.  However her Blood sugar was very high at 353 and they should contact Dr. Talita Grubbs

## 2020-05-29 NOTE — TELEPHONE ENCOUNTER
Ref. Range 5/28/2020 15:15   Glucose Latest Ref Range: 70 - 99 mg/dL 353 (H)   Sodium Latest Ref Range: 136 - 145 mmol/L 140   Potassium Latest Ref Range: 3.5 - 5.1 mmol/L 3.9   Chloride Latest Ref Range: 98 - 112 mmol/L 107   Carbon Dioxide, Total Late

## 2020-06-03 ENCOUNTER — HOSPITAL ENCOUNTER (OUTPATIENT)
Dept: CARDIOLOGY CLINIC | Facility: HOSPITAL | Age: 85
Discharge: HOME | End: 2020-06-03
Attending: NURSE PRACTITIONER
Payer: MEDICARE

## 2020-06-03 VITALS
SYSTOLIC BLOOD PRESSURE: 132 MMHG | RESPIRATION RATE: 19 BRPM | HEART RATE: 74 BPM | OXYGEN SATURATION: 95 % | BODY MASS INDEX: 24 KG/M2 | WEIGHT: 131.81 LBS | DIASTOLIC BLOOD PRESSURE: 35 MMHG

## 2020-06-03 DIAGNOSIS — D50.0 IRON DEFICIENCY ANEMIA DUE TO CHRONIC BLOOD LOSS: ICD-10-CM

## 2020-06-03 DIAGNOSIS — I27.20 PULMONARY HTN (HCC): ICD-10-CM

## 2020-06-03 DIAGNOSIS — I48.20 CHRONIC ATRIAL FIBRILLATION (HCC): ICD-10-CM

## 2020-06-03 DIAGNOSIS — R06.00 DYSPNEA ON EXERTION: ICD-10-CM

## 2020-06-03 DIAGNOSIS — I10 ESSENTIAL HYPERTENSION: ICD-10-CM

## 2020-06-03 DIAGNOSIS — I50.32 CHRONIC HEART FAILURE WITH NORMAL EJECTION FRACTION (HCC): Primary | ICD-10-CM

## 2020-06-03 DIAGNOSIS — N18.30 CKD (CHRONIC KIDNEY DISEASE) STAGE 3, GFR 30-59 ML/MIN (HCC): ICD-10-CM

## 2020-06-03 DIAGNOSIS — Z95.1 HX OF CABG: ICD-10-CM

## 2020-06-03 DIAGNOSIS — Z95.2 HISTORY OF MITRAL VALVE REPLACEMENT: ICD-10-CM

## 2020-06-03 PROCEDURE — 99215 OFFICE O/P EST HI 40 MIN: CPT | Performed by: NURSE PRACTITIONER

## 2020-06-03 NOTE — PROGRESS NOTES
Pt. assessed. No s/s of increased shortness of breath, fatigue, chest pain or edema noted. Weight down 4 lbs at 131.8 lbs. Reviewed current list of patient's allergies and medication; updated EMR.  Labs ordered and drawn in the   [facility] to assess kidney functi R67

## 2020-06-08 RX ORDER — METHOCARBAMOL 750 MG/1
TABLET ORAL
Qty: 12 CAPSULE | Refills: 0 | OUTPATIENT
Start: 2020-06-08

## 2020-06-08 RX ORDER — SPIRONOLACTONE 25 MG/1
12.5 TABLET ORAL DAILY
Qty: 15 TABLET | Refills: 5 | Status: SHIPPED | OUTPATIENT
Start: 2020-06-08 | End: 2020-10-01

## 2020-07-14 ENCOUNTER — HOSPITAL ENCOUNTER (OUTPATIENT)
Dept: CARDIOLOGY CLINIC | Facility: HOSPITAL | Age: 85
Discharge: HOME | End: 2020-07-14
Attending: NURSE PRACTITIONER
Payer: MEDICARE

## 2020-07-14 ENCOUNTER — HOSPITAL ENCOUNTER (OUTPATIENT)
Dept: LAB | Facility: HOSPITAL | Age: 85
Discharge: HOME | End: 2020-07-14
Attending: NURSE PRACTITIONER
Payer: MEDICARE

## 2020-07-14 VITALS
SYSTOLIC BLOOD PRESSURE: 124 MMHG | HEART RATE: 75 BPM | DIASTOLIC BLOOD PRESSURE: 46 MMHG | BODY MASS INDEX: 24 KG/M2 | RESPIRATION RATE: 17 BRPM | OXYGEN SATURATION: 96 % | WEIGHT: 132.13 LBS

## 2020-07-14 DIAGNOSIS — I10 ESSENTIAL HYPERTENSION: ICD-10-CM

## 2020-07-14 DIAGNOSIS — I48.20 CHRONIC ATRIAL FIBRILLATION (HCC): ICD-10-CM

## 2020-07-14 DIAGNOSIS — N18.30 CKD (CHRONIC KIDNEY DISEASE) STAGE 3, GFR 30-59 ML/MIN (HCC): ICD-10-CM

## 2020-07-14 DIAGNOSIS — I50.9 CHF (CONGESTIVE HEART FAILURE) (HCC): ICD-10-CM

## 2020-07-14 DIAGNOSIS — I50.32 CHRONIC HEART FAILURE WITH NORMAL EJECTION FRACTION (HCC): Primary | ICD-10-CM

## 2020-07-14 DIAGNOSIS — D50.0 IRON DEFICIENCY ANEMIA DUE TO CHRONIC BLOOD LOSS: ICD-10-CM

## 2020-07-14 DIAGNOSIS — I27.20 PULMONARY HTN (HCC): ICD-10-CM

## 2020-07-14 LAB
ANION GAP SERPL CALC-SCNC: 5 MMOL/L (ref 0–18)
BUN BLD-MCNC: 48 MG/DL (ref 7–18)
BUN/CREAT SERPL: 31.6 (ref 10–20)
CALCIUM BLD-MCNC: 9.6 MG/DL (ref 8.5–10.1)
CHLORIDE SERPL-SCNC: 108 MMOL/L (ref 98–112)
CO2 SERPL-SCNC: 25 MMOL/L (ref 21–32)
CREAT BLD-MCNC: 1.52 MG/DL (ref 0.55–1.02)
DEPRECATED RDW RBC AUTO: 58.5 FL (ref 35.1–46.3)
ERYTHROCYTE [DISTWIDTH] IN BLOOD BY AUTOMATED COUNT: 19.1 % (ref 11–15)
GLUCOSE BLD-MCNC: 122 MG/DL (ref 70–99)
HCT VFR BLD AUTO: 31.6 % (ref 35–48)
HGB BLD-MCNC: 9.7 G/DL (ref 12–16)
MCH RBC QN AUTO: 25.6 PG (ref 26–34)
MCHC RBC AUTO-ENTMCNC: 30.7 G/DL (ref 31–37)
MCV RBC AUTO: 83.4 FL (ref 80–100)
OSMOLALITY SERPL CALC.SUM OF ELEC: 300 MOSM/KG (ref 275–295)
PATIENT FASTING Y/N/NP: YES
PLATELET # BLD AUTO: 230 10(3)UL (ref 150–450)
POTASSIUM SERPL-SCNC: 4.3 MMOL/L (ref 3.5–5.1)
RBC # BLD AUTO: 3.79 X10(6)UL (ref 3.8–5.3)
SODIUM SERPL-SCNC: 138 MMOL/L (ref 136–145)
WBC # BLD AUTO: 8.1 X10(3) UL (ref 4–11)

## 2020-07-14 PROCEDURE — 99215 OFFICE O/P EST HI 40 MIN: CPT | Performed by: NURSE PRACTITIONER

## 2020-07-14 PROCEDURE — 80048 BASIC METABOLIC PNL TOTAL CA: CPT | Performed by: NURSE PRACTITIONER

## 2020-07-14 PROCEDURE — 36415 COLL VENOUS BLD VENIPUNCTURE: CPT | Performed by: NURSE PRACTITIONER

## 2020-07-14 PROCEDURE — 85027 COMPLETE CBC AUTOMATED: CPT | Performed by: NURSE PRACTITIONER

## 2020-07-14 RX ORDER — METHOCARBAMOL 750 MG/1
TABLET ORAL
Qty: 12 CAPSULE | Refills: 0 | OUTPATIENT
Start: 2020-07-14

## 2020-07-14 RX ORDER — CHOLECALCIFEROL (VITAMIN D3) 1250 MCG
1 CAPSULE ORAL WEEKLY
Qty: 8 CAPSULE | Refills: 0 | Status: SHIPPED | OUTPATIENT
Start: 2020-07-14 | End: 2020-12-07

## 2020-07-14 NOTE — PROGRESS NOTES
Patient was assessed. No s/s of shortness of breath, fatigue, chest pain or edema noted. Weight stable at 132.1 lbs. Reviewed current list of patient's allergies and medication; Called daughter and reviewed all the medications with her. updated EMR.  Labs o

## 2020-07-14 NOTE — PROGRESS NOTES
[facility] Cardiac Health Progress Note    Ryder Cuenca is a 80year old female who presents to clinic for APN assessment and management of chronic HFpEF/RV and is functional class 3-4.      Subjective:  She returns for routine assessm 7/14/2020 11:47   Glucose Latest Ref Range: 70 - 99 mg/dL 122 (H)   Sodium Latest Ref Range: 136 - 145 mmol/L 138   Potassium Latest Ref Range: 3.5 - 5.1 mmol/L 4.3   Chloride Latest Ref Range: 98 - 112 mmol/L 108   Carbon Dioxide, Total Latest Ref Range: Take 200 mg by mouth nightly., Disp: , Rfl:     Exam:   General:         Alert, in no apparent distress  HEENT:          No JVD  Lungs:            CTAB                     CV:                  S1, S2 regular  Abdomen:       Non-distended/round, soft, non-t nallely.    Dea Mercado, APRN  7/14/2020

## 2020-07-14 NOTE — PATIENT INSTRUCTIONS
Heart Failure Discharge Instructions  No changes to medications. Activity: Regular exercise and activity is important for your overall health and to help keep your heart strong and functioning as well as possible.    Walk at a slow to moderate pace for shortness of breath or fatigue  · You are short of breath lying down, you need more pillows to breathe comfortably,  or wake up during the night short of breath  · You urinate less often during the day and more often at night  · You have a bloated feeling,

## 2020-09-03 ENCOUNTER — TELEPHONE (OUTPATIENT)
Dept: CARDIOLOGY CLINIC | Facility: HOSPITAL | Age: 85
End: 2020-09-03

## 2020-09-03 NOTE — TELEPHONE ENCOUNTER
This patient's granddaughter, Paula Meredith, called the [facility] today reporting that it has become increasingly difficult to transport the patient to appointments and asked if there was a better way to have this patient's blood drawn before her next appointment.

## 2020-09-03 NOTE — TELEPHONE ENCOUNTER
This patient's granddaughter, Vin Dove, called the [facility] to provide an update that Dena's palliative care nurse informed them that she cannot draw blood.   Vin Dove states that she will call Dena's primary physician to determine if she can get home servic

## 2020-09-04 ENCOUNTER — TELEPHONE (OUTPATIENT)
Dept: CARDIOLOGY CLINIC | Facility: HOSPITAL | Age: 85
End: 2020-09-04

## 2020-09-04 NOTE — TELEPHONE ENCOUNTER
This patient's granddaughter, Nirmala Crespo, called the [facility] today reporting that Dena's palliative care nurse instructed them to use [facility] to get her blood drawn at home.   Nirmala Crespo stated that the [facility] would need to fax [facility] the lab order,

## 2020-09-10 ENCOUNTER — HOSPITAL ENCOUNTER (OUTPATIENT)
Dept: CARDIOLOGY CLINIC | Facility: HOSPITAL | Age: 85
Discharge: HOME | End: 2020-09-10
Attending: NURSE PRACTITIONER
Payer: MEDICARE

## 2020-09-10 DIAGNOSIS — Z95.2 HISTORY OF MITRAL VALVE REPLACEMENT: ICD-10-CM

## 2020-09-10 DIAGNOSIS — I10 ESSENTIAL HYPERTENSION: ICD-10-CM

## 2020-09-10 DIAGNOSIS — I27.20 PULMONARY HTN (HCC): ICD-10-CM

## 2020-09-10 DIAGNOSIS — D50.0 IRON DEFICIENCY ANEMIA DUE TO CHRONIC BLOOD LOSS: ICD-10-CM

## 2020-09-10 DIAGNOSIS — N18.30 CKD (CHRONIC KIDNEY DISEASE) STAGE 3, GFR 30-59 ML/MIN (HCC): ICD-10-CM

## 2020-09-10 DIAGNOSIS — Z95.1 HX OF CABG: ICD-10-CM

## 2020-09-10 DIAGNOSIS — I50.33 ACUTE ON CHRONIC HEART FAILURE WITH PRESERVED EJECTION FRACTION (HFPEF) (HCC): Primary | ICD-10-CM

## 2020-09-10 DIAGNOSIS — I48.20 CHRONIC ATRIAL FIBRILLATION (HCC): ICD-10-CM

## 2020-09-10 PROCEDURE — 99443 PHONE E/M BY PHYS 21-30 MIN: CPT | Performed by: NURSE PRACTITIONER

## 2020-09-10 NOTE — PROGRESS NOTES
Virtual Telephone Check-In    Ryder Cuenca verbally consents to a Virtual/Telephone Check-In visit on 09/10/20. Patient has been referred to the [facility] website at [email or web address] to review the yearly Consent to Treat document.     Patient under months, anemia likely also contributing. · CKD stage 3 - baseline creatinine 1.2-1.4 mg/dl; worsening with creatinine 9/8 1.51.    · Chronic Afib - Hx Medtronic PPM with last device interrogation showing no VHR events,  38%. Coumadin managed by [facility]

## 2020-09-17 ENCOUNTER — HOSPITAL ENCOUNTER (OUTPATIENT)
Dept: CARDIOLOGY CLINIC | Facility: HOSPITAL | Age: 85
Discharge: HOME | End: 2020-09-17
Attending: NURSE PRACTITIONER
Payer: MEDICARE

## 2020-09-17 ENCOUNTER — TELEPHONE (OUTPATIENT)
Dept: CARDIOLOGY CLINIC | Facility: HOSPITAL | Age: 85
End: 2020-09-17

## 2020-09-17 VITALS
RESPIRATION RATE: 28 BRPM | HEART RATE: 83 BPM | BODY MASS INDEX: 25 KG/M2 | DIASTOLIC BLOOD PRESSURE: 66 MMHG | OXYGEN SATURATION: 93 % | SYSTOLIC BLOOD PRESSURE: 99 MMHG | WEIGHT: 135.63 LBS

## 2020-09-17 DIAGNOSIS — D50.0 IRON DEFICIENCY ANEMIA DUE TO CHRONIC BLOOD LOSS: ICD-10-CM

## 2020-09-17 DIAGNOSIS — Z95.2 HISTORY OF MITRAL VALVE REPLACEMENT: ICD-10-CM

## 2020-09-17 DIAGNOSIS — N18.30 CKD (CHRONIC KIDNEY DISEASE) STAGE 3, GFR 30-59 ML/MIN (HCC): ICD-10-CM

## 2020-09-17 DIAGNOSIS — I27.20 PULMONARY HTN (HCC): ICD-10-CM

## 2020-09-17 DIAGNOSIS — I50.9 CHF (CONGESTIVE HEART FAILURE) (HCC): ICD-10-CM

## 2020-09-17 DIAGNOSIS — I10 ESSENTIAL HYPERTENSION: ICD-10-CM

## 2020-09-17 DIAGNOSIS — I48.20 CHRONIC ATRIAL FIBRILLATION (HCC): ICD-10-CM

## 2020-09-17 DIAGNOSIS — I50.33 ACUTE ON CHRONIC DIASTOLIC CHF (CONGESTIVE HEART FAILURE), NYHA CLASS 4 (HCC): Primary | ICD-10-CM

## 2020-09-17 DIAGNOSIS — Z95.1 HX OF CABG: ICD-10-CM

## 2020-09-17 LAB
ANION GAP SERPL CALC-SCNC: 5 MMOL/L (ref 0–18)
BUN BLD-MCNC: 25 MG/DL (ref 7–18)
BUN/CREAT SERPL: 16.2 (ref 10–20)
CALCIUM BLD-MCNC: 9.8 MG/DL (ref 8.5–10.1)
CHLORIDE SERPL-SCNC: 107 MMOL/L (ref 98–112)
CO2 SERPL-SCNC: 27 MMOL/L (ref 21–32)
CREAT BLD-MCNC: 1.54 MG/DL (ref 0.55–1.02)
GLUCOSE BLD-MCNC: 139 MG/DL (ref 70–99)
NT-PROBNP SERPL-MCNC: 5192 PG/ML (ref ?–450)
OSMOLALITY SERPL CALC.SUM OF ELEC: 295 MOSM/KG (ref 275–295)
PATIENT FASTING Y/N/NP: NO
POTASSIUM SERPL-SCNC: 3.9 MMOL/L (ref 3.5–5.1)
SODIUM SERPL-SCNC: 139 MMOL/L (ref 136–145)

## 2020-09-17 PROCEDURE — 99215 OFFICE O/P EST HI 40 MIN: CPT | Performed by: NURSE PRACTITIONER

## 2020-09-17 RX ORDER — BUMETANIDE 0.25 MG/ML
2 INJECTION, SOLUTION INTRAMUSCULAR; INTRAVENOUS ONCE
Status: COMPLETED | OUTPATIENT
Start: 2020-09-17 | End: 2020-09-17

## 2020-09-17 RX ORDER — TORSEMIDE 20 MG/1
40 TABLET ORAL 2 TIMES DAILY
Qty: 120 TABLET | Refills: 1 | Status: SHIPPED | OUTPATIENT
Start: 2020-09-17 | End: 2021-01-13

## 2020-09-17 RX ADMIN — BUMETANIDE 2 MG: 0.25 INJECTION, SOLUTION INTRAMUSCULAR; INTRAVENOUS at 16:45:00

## 2020-09-17 NOTE — TELEPHONE ENCOUNTER
Jessica (daughter) called today to say that her Mom's ankles are more swollen. She had a virtual visit last week and they had increased her torsemide for 3 days. Her weight is up 2 lbs today. Her weight range is 133-135. Today it is 135.   Discussed with

## 2020-09-17 NOTE — PROGRESS NOTES
Pt. Assessed. Pt. c/o __sob and swelling . Weight 135_. APN notified of pt's c/o sob and swelling_. Labs ordered. Reviewed allergies and list of current medications with pt. And updated it int he EMR. IV established per protocol. IV_bumex_ given. Pt.  Clover Crisostomo

## 2020-09-17 NOTE — PROGRESS NOTES
[address]  Heart Failure Clinic Progress Note    Sade Mclaughlin is a 80year old female who presents to clinic for APN assessment and management of chronic HFpEF/RV and is functional class 3-4.        Subjective:  Dena presents for an add-on visi by mouth once a week., Disp: 8 capsule, Rfl: 0  spironolactone 25 MG Oral Tab, Take 0.5 tablets (12.5 mg total) by mouth daily.  Take 30 mins before your Torsemide, Disp: 15 tablet, Rfl: 5  amLODIPine Besylate 5 MG Oral Tab, Take 1 tablet (5 mg total) by mo deficiency - last HGB 9.7g/dl and stable. Hx IV Venofer and pruritic rash. On oral iron. · Hx Acute respiratory failure   · End of life- continues to follow with Palliative care.   · Borderline DM - recent a1c 6.2%, results were sent to PCP.     Plan:

## 2020-09-17 NOTE — PATIENT INSTRUCTIONS
Heart Failure Discharge Instr    Activity: Regular exercise and activity is important for your overall health and to help keep your heart strong and functioning as well as possible. Walk at a slow to moderate pace for 15-20 minutes 3-5 days per week. short of breath lying down, you need more pillows to breathe comfortably,  or wake up during the night short of breath  · You urinate less often during the day and more often at night  · You have a bloated feeling, upset stomach, loss of appetite, or your

## 2020-10-01 ENCOUNTER — HOSPITAL ENCOUNTER (OUTPATIENT)
Dept: CARDIOLOGY CLINIC | Facility: HOSPITAL | Age: 85
Discharge: HOME | End: 2020-10-01
Attending: NURSE PRACTITIONER
Payer: MEDICARE

## 2020-10-01 ENCOUNTER — HOSPITAL ENCOUNTER (OUTPATIENT)
Dept: GENERAL RADIOLOGY | Facility: HOSPITAL | Age: 85
Discharge: HOME | End: 2020-10-01
Attending: NURSE PRACTITIONER
Payer: MEDICARE

## 2020-10-01 VITALS
HEART RATE: 80 BPM | DIASTOLIC BLOOD PRESSURE: 55 MMHG | SYSTOLIC BLOOD PRESSURE: 148 MMHG | WEIGHT: 137 LBS | OXYGEN SATURATION: 100 % | BODY MASS INDEX: 25 KG/M2

## 2020-10-01 DIAGNOSIS — I10 ESSENTIAL HYPERTENSION: ICD-10-CM

## 2020-10-01 DIAGNOSIS — I48.20 CHRONIC ATRIAL FIBRILLATION (HCC): ICD-10-CM

## 2020-10-01 DIAGNOSIS — I50.33 ACUTE ON CHRONIC DIASTOLIC CHF (CONGESTIVE HEART FAILURE), NYHA CLASS 4 (HCC): Primary | ICD-10-CM

## 2020-10-01 DIAGNOSIS — N18.32 STAGE 3B CHRONIC KIDNEY DISEASE (HCC): ICD-10-CM

## 2020-10-01 DIAGNOSIS — I27.20 PULMONARY HTN (HCC): ICD-10-CM

## 2020-10-01 DIAGNOSIS — Z95.1 HX OF CABG: ICD-10-CM

## 2020-10-01 DIAGNOSIS — E55.9 VITAMIN D DEFICIENCY: ICD-10-CM

## 2020-10-01 DIAGNOSIS — Z95.2 HISTORY OF MITRAL VALVE REPLACEMENT: ICD-10-CM

## 2020-10-01 DIAGNOSIS — I50.9 CHF (CONGESTIVE HEART FAILURE) (HCC): ICD-10-CM

## 2020-10-01 DIAGNOSIS — D50.0 IRON DEFICIENCY ANEMIA DUE TO CHRONIC BLOOD LOSS: ICD-10-CM

## 2020-10-01 PROCEDURE — 71045 X-RAY EXAM CHEST 1 VIEW: CPT | Performed by: NURSE PRACTITIONER

## 2020-10-01 PROCEDURE — 99215 OFFICE O/P EST HI 40 MIN: CPT | Performed by: NURSE PRACTITIONER

## 2020-10-01 RX ORDER — POTASSIUM CHLORIDE 20 MEQ/1
60 TABLET, EXTENDED RELEASE ORAL ONCE
Status: COMPLETED | OUTPATIENT
Start: 2020-10-01 | End: 2020-10-01

## 2020-10-01 RX ORDER — BUMETANIDE 0.25 MG/ML
3 INJECTION, SOLUTION INTRAMUSCULAR; INTRAVENOUS ONCE
Status: COMPLETED | OUTPATIENT
Start: 2020-10-01 | End: 2020-10-01

## 2020-10-01 RX ORDER — SPIRONOLACTONE 25 MG/1
25 TABLET ORAL DAILY
Qty: 30 TABLET | Refills: 3 | Status: SHIPPED | OUTPATIENT
Start: 2020-10-01

## 2020-10-01 RX ADMIN — BUMETANIDE 3 MG: 0.25 INJECTION, SOLUTION INTRAMUSCULAR; INTRAVENOUS at 13:05:00

## 2020-10-01 RX ADMIN — POTASSIUM CHLORIDE 60 MEQ: 20 TABLET, EXTENDED RELEASE ORAL at 13:01:00

## 2020-10-01 NOTE — PROGRESS NOTES
Pt assessed. Pt c/o dyspnea which she states has been ongoing for the past 3 months. Sats were initially 85% on RA. Upon removing face mask prior to applying O2, sats already improved to 90-91%. 2L NC applied for comfort. Weight up 2 lbs at 137.0 lbs.  Pt's

## 2020-10-01 NOTE — PATIENT INSTRUCTIONS
Heart Failure Discharge Instructions    · Increase Spironolactone to 25 mg daily. Activity: Regular exercise and activity is important for your overall health and to help keep your heart strong and functioning as well as possible.    Walk at a slow to mo because of shortness of breath or fatigue  · You are short of breath lying down, you need more pillows to breathe comfortably,  or wake up during the night short of breath  · You urinate less often during the day and more often at night  · You have a bloat

## 2020-10-01 NOTE — PROGRESS NOTES
[address]  Heart Failure Clinic Progress Note    Dena Rasmussen is a 80year old female who presents to clinic for APN assessment and management of chronic HFpEF/RV and is functional class 3-4.      Subjective:  Rosamaria Barthel returns for routine assessment • Arrhythmia    • Atherosclerosis of coronary artery    • Congestive heart disease [facility])    • Essential hypertension    • Heart murmur    • Hyperlipidemia       Past Surgical History:   Procedure Laterality Date   • ANGIOGRAM     • ANGIOPLASTY (CORONARY Abdomen:       Non-distended, soft, non-tender, BS+  Extremities:    1+ distal bilateral lower extremity/pitting edema         Neuro:             A&O x 3, GARCIA  Skin:                Pink, warm, dry    Education:  Patient instructed regarding sodium restr spironolactone 25mg PO daily  · Return to clinic in 1 month. Her daughter mentioned that Rachana and she are discussing options of not returning to the clinic due to her age, frailty, and overall difficulties with transportation.   This is certainly no unrea

## 2020-10-30 DIAGNOSIS — I50.9 CHF (CONGESTIVE HEART FAILURE) (HCC): Primary | ICD-10-CM

## 2020-11-02 ENCOUNTER — HOSPITAL ENCOUNTER (OUTPATIENT)
Dept: LAB | Facility: HOSPITAL | Age: 85
Discharge: HOME | End: 2020-11-02
Attending: NURSE PRACTITIONER
Payer: MEDICARE

## 2020-11-02 ENCOUNTER — HOSPITAL ENCOUNTER (OUTPATIENT)
Dept: CARDIOLOGY CLINIC | Facility: HOSPITAL | Age: 85
Discharge: HOME | End: 2020-11-02
Attending: NURSE PRACTITIONER
Payer: MEDICARE

## 2020-11-02 VITALS
BODY MASS INDEX: 24 KG/M2 | DIASTOLIC BLOOD PRESSURE: 118 MMHG | WEIGHT: 129.38 LBS | SYSTOLIC BLOOD PRESSURE: 147 MMHG | HEART RATE: 77 BPM | OXYGEN SATURATION: 92 %

## 2020-11-02 DIAGNOSIS — I10 ESSENTIAL HYPERTENSION: ICD-10-CM

## 2020-11-02 DIAGNOSIS — I50.9 CHF (CONGESTIVE HEART FAILURE) (HCC): ICD-10-CM

## 2020-11-02 DIAGNOSIS — I48.20 CHRONIC ATRIAL FIBRILLATION (HCC): ICD-10-CM

## 2020-11-02 DIAGNOSIS — I50.32 CHRONIC HEART FAILURE WITH NORMAL EJECTION FRACTION (HCC): Primary | ICD-10-CM

## 2020-11-02 DIAGNOSIS — N18.32 STAGE 3B CHRONIC KIDNEY DISEASE (HCC): ICD-10-CM

## 2020-11-02 DIAGNOSIS — D50.0 IRON DEFICIENCY ANEMIA DUE TO CHRONIC BLOOD LOSS: ICD-10-CM

## 2020-11-02 PROCEDURE — 99215 OFFICE O/P EST HI 40 MIN: CPT | Performed by: NURSE PRACTITIONER

## 2020-11-02 PROCEDURE — 36415 COLL VENOUS BLD VENIPUNCTURE: CPT | Performed by: NURSE PRACTITIONER

## 2020-11-02 PROCEDURE — 85027 COMPLETE CBC AUTOMATED: CPT | Performed by: NURSE PRACTITIONER

## 2020-11-02 PROCEDURE — 80048 BASIC METABOLIC PNL TOTAL CA: CPT | Performed by: NURSE PRACTITIONER

## 2020-11-02 NOTE — PROGRESS NOTES
[facility] Cardiac Health Progress Note    Keiko Bliss is a 80year old female who presents to clinic for APN assessment and management of chronic HFpEF/RV heart failure and is functional class 3.      Subjective:  She returns for rou of 11/2/2020 11:40   Ref.  Range 11/2/2020 10:47   Glucose Latest Ref Range: 70 - 99 mg/dL 140 (H)   Sodium Latest Ref Range: 136 - 145 mmol/L 140   Potassium Latest Ref Range: 3.5 - 5.1 mmol/L 3.8   Chloride Latest Ref Range: 98 - 112 mmol/L 105   Carbon D Take 200 mg by mouth nightly., Disp: , Rfl:     Exam:   General:         Alert, in no apparent distress  HEENT:          No JVD  Lungs:            Slightly diminished at RLL                     CV:                  irregular  Abdomen:       Non-distended, counseling and coordinating care.     Edwardo Michele, APRN  11/2/2020

## 2020-11-02 NOTE — PROGRESS NOTES
Pt. Assessed. Pt. complaining of _sob decreased appetite. Weight _129.4lbs. APN notified of patient's complaint of shortness of breath and dry cough carlos yintermittent. Labs ordered.  Reviewed allergies and list of current medications with patient and update

## 2020-12-07 RX ORDER — CHOLECALCIFEROL (VITAMIN D3) 1250 MCG
1 CAPSULE ORAL WEEKLY
Qty: 8 CAPSULE | Refills: 2 | Status: SHIPPED | OUTPATIENT
Start: 2020-12-07

## 2021-01-01 ENCOUNTER — ANCILLARY ORDERS (OUTPATIENT)
Dept: CARDIOLOGY | Age: 86
End: 2021-01-01

## 2021-01-01 ENCOUNTER — APPOINTMENT (OUTPATIENT)
Dept: CARDIOLOGY | Age: 86
End: 2021-01-01

## 2021-01-01 ENCOUNTER — ANCILLARY PROCEDURE (OUTPATIENT)
Dept: CARDIOLOGY | Age: 86
End: 2021-01-01
Attending: INTERNAL MEDICINE

## 2021-01-01 ENCOUNTER — TELEPHONE (OUTPATIENT)
Dept: CARDIOLOGY | Age: 86
End: 2021-01-01

## 2021-01-01 DIAGNOSIS — Z95.0 CARDIAC PACEMAKER: ICD-10-CM

## 2021-01-01 PROCEDURE — 93294 REM INTERROG EVL PM/LDLS PM: CPT | Performed by: INTERNAL MEDICINE

## 2021-01-01 PROCEDURE — X1114 CARDIAC DEVICE HOME CHECK - REMOTE UNSCHEDULED: HCPCS | Performed by: INTERNAL MEDICINE

## 2021-01-01 RX ORDER — METOPROLOL SUCCINATE 200 MG/1
TABLET, EXTENDED RELEASE ORAL
Qty: 90 TABLET | Refills: 3 | Status: SHIPPED | OUTPATIENT
Start: 2021-01-01

## 2021-01-13 ENCOUNTER — TELEPHONE (OUTPATIENT)
Dept: CARDIOLOGY CLINIC | Facility: HOSPITAL | Age: 86
End: 2021-01-13

## 2021-01-13 RX ORDER — TORSEMIDE 20 MG/1
40 TABLET ORAL 2 TIMES DAILY
Qty: 120 TABLET | Refills: 3 | Status: SHIPPED | OUTPATIENT
Start: 2021-01-13

## 2021-01-13 NOTE — TELEPHONE ENCOUNTER
Rec'd a call from grand-daughter requesting refill on Torsemide. Per last APN note RX for  Torsemide 40mg BID was sent to [facility] with confirmation.

## 2021-02-27 NOTE — PROGRESS NOTES
Labs reviewed and stable after increase in Torsemide at her last visit.
RN called the patient and informed her that her INR today was 2.02. Pt repeated the INR with read back. RN instructed the patient to call the INR into her Coumadin Clinic. Pt said that she would do this immediately.
Dr. Dumont

## 2021-06-14 ENCOUNTER — TELEPHONE (OUTPATIENT)
Dept: CARDIOLOGY | Age: 86
End: 2021-06-14

## 2021-06-15 ENCOUNTER — TELEPHONE (OUTPATIENT)
Dept: CARDIOLOGY CLINIC | Facility: HOSPITAL | Age: 86
End: 2021-06-15

## 2021-06-15 NOTE — TELEPHONE ENCOUNTER
I received a refill request via fax for Vit D3. Neptali Hinojosa was discharged from our care. I called and left a message for [facility] ([phone]) for them to contact her PCP for a refill.